# Patient Record
Sex: FEMALE | Race: ASIAN | NOT HISPANIC OR LATINO | Employment: UNEMPLOYED | URBAN - METROPOLITAN AREA
[De-identification: names, ages, dates, MRNs, and addresses within clinical notes are randomized per-mention and may not be internally consistent; named-entity substitution may affect disease eponyms.]

---

## 2018-01-25 ENCOUNTER — TRANSFERRED RECORDS (OUTPATIENT)
Dept: HEALTH INFORMATION MANAGEMENT | Facility: CLINIC | Age: 23
End: 2018-01-25

## 2018-07-04 ENCOUNTER — HOSPITAL ENCOUNTER (OUTPATIENT)
Facility: CLINIC | Age: 23
Setting detail: OBSERVATION
Discharge: HOME | End: 2018-07-05
Attending: EMERGENCY MEDICINE | Admitting: SURGERY
Payer: COMMERCIAL

## 2018-07-04 ENCOUNTER — APPOINTMENT (OUTPATIENT)
Dept: ULTRASOUND IMAGING | Facility: CLINIC | Age: 23
End: 2018-07-04
Attending: EMERGENCY MEDICINE
Payer: COMMERCIAL

## 2018-07-04 DIAGNOSIS — K37 APPENDICITIS, UNSPECIFIED APPENDICITIS TYPE: Primary | ICD-10-CM

## 2018-07-04 DIAGNOSIS — R10.9 ABDOMINAL PAIN: ICD-10-CM

## 2018-07-04 LAB
ALBUMIN UR-MCNC: NEGATIVE MG/DL
ANION GAP SERPL CALCULATED.3IONS-SCNC: 7 MMOL/L (ref 3–14)
APPEARANCE UR: CLEAR
BACTERIA #/AREA URNS HPF: ABNORMAL /HPF
BASOPHILS # BLD AUTO: 0 10E9/L (ref 0–0.2)
BASOPHILS NFR BLD AUTO: 0.1 %
BILIRUB UR QL STRIP: NEGATIVE
BUN SERPL-MCNC: 8 MG/DL (ref 7–30)
CALCIUM SERPL-MCNC: 8.7 MG/DL (ref 8.5–10.1)
CHLORIDE SERPL-SCNC: 104 MMOL/L (ref 94–109)
CO2 SERPL-SCNC: 27 MMOL/L (ref 20–32)
COLOR UR AUTO: ABNORMAL
CREAT SERPL-MCNC: 0.77 MG/DL (ref 0.52–1.04)
DIFFERENTIAL METHOD BLD: NORMAL
EOSINOPHIL # BLD AUTO: 0 10E9/L (ref 0–0.7)
EOSINOPHIL NFR BLD AUTO: 0.3 %
ERYTHROCYTE [DISTWIDTH] IN BLOOD BY AUTOMATED COUNT: 12.2 % (ref 10–15)
GFR SERPL CREATININE-BSD FRML MDRD: >90 ML/MIN/1.7M2
GLUCOSE SERPL-MCNC: 117 MG/DL (ref 70–99)
GLUCOSE UR STRIP-MCNC: NEGATIVE MG/DL
HCG SERPL QL: NEGATIVE
HCT VFR BLD AUTO: 38.5 % (ref 35–47)
HGB BLD-MCNC: 13.1 G/DL (ref 11.7–15.7)
HGB UR QL STRIP: NEGATIVE
IMM GRANULOCYTES # BLD: 0 10E9/L (ref 0–0.4)
IMM GRANULOCYTES NFR BLD: 0.3 %
KETONES UR STRIP-MCNC: NEGATIVE MG/DL
LEUKOCYTE ESTERASE UR QL STRIP: ABNORMAL
LYMPHOCYTES # BLD AUTO: 2.4 10E9/L (ref 0.8–5.3)
LYMPHOCYTES NFR BLD AUTO: 23.3 %
MCH RBC QN AUTO: 29.8 PG (ref 26.5–33)
MCHC RBC AUTO-ENTMCNC: 34 G/DL (ref 31.5–36.5)
MCV RBC AUTO: 88 FL (ref 78–100)
MONOCYTES # BLD AUTO: 0.8 10E9/L (ref 0–1.3)
MONOCYTES NFR BLD AUTO: 7.6 %
NEUTROPHILS # BLD AUTO: 7 10E9/L (ref 1.6–8.3)
NEUTROPHILS NFR BLD AUTO: 68.4 %
NITRATE UR QL: NEGATIVE
NRBC # BLD AUTO: 0 10*3/UL
NRBC BLD AUTO-RTO: 0 /100
PH UR STRIP: 6.5 PH (ref 5–7)
PLATELET # BLD AUTO: 259 10E9/L (ref 150–450)
POTASSIUM SERPL-SCNC: 3 MMOL/L (ref 3.4–5.3)
RBC # BLD AUTO: 4.39 10E12/L (ref 3.8–5.2)
RBC #/AREA URNS AUTO: 1 /HPF (ref 0–2)
SODIUM SERPL-SCNC: 138 MMOL/L (ref 133–144)
SOURCE: ABNORMAL
SP GR UR STRIP: 1 (ref 1–1.03)
SQUAMOUS #/AREA URNS AUTO: 1 /HPF (ref 0–1)
UROBILINOGEN UR STRIP-MCNC: NORMAL MG/DL (ref 0–2)
WBC # BLD AUTO: 10.3 10E9/L (ref 4–11)
WBC #/AREA URNS AUTO: 3 /HPF (ref 0–5)

## 2018-07-04 PROCEDURE — 25000128 H RX IP 250 OP 636: Performed by: EMERGENCY MEDICINE

## 2018-07-04 PROCEDURE — 96365 THER/PROPH/DIAG IV INF INIT: CPT

## 2018-07-04 PROCEDURE — 76705 ECHO EXAM OF ABDOMEN: CPT

## 2018-07-04 PROCEDURE — 81001 URINALYSIS AUTO W/SCOPE: CPT | Performed by: EMERGENCY MEDICINE

## 2018-07-04 PROCEDURE — G0378 HOSPITAL OBSERVATION PER HR: HCPCS

## 2018-07-04 PROCEDURE — 85025 COMPLETE CBC W/AUTO DIFF WBC: CPT | Performed by: EMERGENCY MEDICINE

## 2018-07-04 PROCEDURE — 99285 EMERGENCY DEPT VISIT HI MDM: CPT | Mod: 25

## 2018-07-04 PROCEDURE — 80048 BASIC METABOLIC PNL TOTAL CA: CPT | Performed by: EMERGENCY MEDICINE

## 2018-07-04 PROCEDURE — 96361 HYDRATE IV INFUSION ADD-ON: CPT

## 2018-07-04 PROCEDURE — 84703 CHORIONIC GONADOTROPIN ASSAY: CPT | Performed by: EMERGENCY MEDICINE

## 2018-07-04 RX ORDER — NALOXONE HYDROCHLORIDE 0.4 MG/ML
.1-.4 INJECTION, SOLUTION INTRAMUSCULAR; INTRAVENOUS; SUBCUTANEOUS
Status: DISCONTINUED | OUTPATIENT
Start: 2018-07-04 | End: 2018-07-05 | Stop reason: HOSPADM

## 2018-07-04 RX ORDER — ACETAMINOPHEN 650 MG/1
650 SUPPOSITORY RECTAL EVERY 4 HOURS PRN
Status: DISCONTINUED | OUTPATIENT
Start: 2018-07-04 | End: 2018-07-05 | Stop reason: HOSPADM

## 2018-07-04 RX ORDER — ACETAMINOPHEN 325 MG/1
650 TABLET ORAL EVERY 4 HOURS PRN
Status: DISCONTINUED | OUTPATIENT
Start: 2018-07-04 | End: 2018-07-05 | Stop reason: HOSPADM

## 2018-07-04 RX ORDER — HYDROMORPHONE HYDROCHLORIDE 1 MG/ML
.3-.5 INJECTION, SOLUTION INTRAMUSCULAR; INTRAVENOUS; SUBCUTANEOUS
Status: DISCONTINUED | OUTPATIENT
Start: 2018-07-04 | End: 2018-07-05 | Stop reason: HOSPADM

## 2018-07-04 RX ORDER — HYDROMORPHONE HYDROCHLORIDE 1 MG/ML
0.5 INJECTION, SOLUTION INTRAMUSCULAR; INTRAVENOUS; SUBCUTANEOUS
Status: DISCONTINUED | OUTPATIENT
Start: 2018-07-04 | End: 2018-07-05 | Stop reason: HOSPADM

## 2018-07-04 RX ORDER — ONDANSETRON 4 MG/1
4 TABLET, ORALLY DISINTEGRATING ORAL EVERY 6 HOURS PRN
Status: DISCONTINUED | OUTPATIENT
Start: 2018-07-04 | End: 2018-07-05 | Stop reason: HOSPADM

## 2018-07-04 RX ORDER — SODIUM CHLORIDE, SODIUM LACTATE, POTASSIUM CHLORIDE, CALCIUM CHLORIDE 600; 310; 30; 20 MG/100ML; MG/100ML; MG/100ML; MG/100ML
INJECTION, SOLUTION INTRAVENOUS CONTINUOUS
Status: DISCONTINUED | OUTPATIENT
Start: 2018-07-04 | End: 2018-07-05 | Stop reason: HOSPADM

## 2018-07-04 RX ORDER — PIPERACILLIN SODIUM, TAZOBACTAM SODIUM 3; .375 G/15ML; G/15ML
3.38 INJECTION, POWDER, LYOPHILIZED, FOR SOLUTION INTRAVENOUS EVERY 8 HOURS SCHEDULED
Status: DISCONTINUED | OUTPATIENT
Start: 2018-07-05 | End: 2018-07-05 | Stop reason: HOSPADM

## 2018-07-04 RX ORDER — SODIUM CHLORIDE 9 MG/ML
1000 INJECTION, SOLUTION INTRAVENOUS CONTINUOUS
Status: DISCONTINUED | OUTPATIENT
Start: 2018-07-04 | End: 2018-07-05 | Stop reason: HOSPADM

## 2018-07-04 RX ORDER — ONDANSETRON 2 MG/ML
4 INJECTION INTRAMUSCULAR; INTRAVENOUS EVERY 30 MIN PRN
Status: DISCONTINUED | OUTPATIENT
Start: 2018-07-04 | End: 2018-07-05 | Stop reason: HOSPADM

## 2018-07-04 RX ORDER — NORGESTIMATE AND ETHINYL ESTRADIOL 0.25-0.035
1 KIT ORAL DAILY
COMMUNITY

## 2018-07-04 RX ORDER — ONDANSETRON 2 MG/ML
4 INJECTION INTRAMUSCULAR; INTRAVENOUS EVERY 6 HOURS PRN
Status: DISCONTINUED | OUTPATIENT
Start: 2018-07-04 | End: 2018-07-05 | Stop reason: HOSPADM

## 2018-07-04 RX ORDER — PIPERACILLIN SODIUM, TAZOBACTAM SODIUM 3; .375 G/15ML; G/15ML
3.38 INJECTION, POWDER, LYOPHILIZED, FOR SOLUTION INTRAVENOUS ONCE
Status: COMPLETED | OUTPATIENT
Start: 2018-07-04 | End: 2018-07-05

## 2018-07-04 RX ADMIN — SODIUM CHLORIDE 1000 ML: 9 INJECTION, SOLUTION INTRAVENOUS at 21:51

## 2018-07-04 RX ADMIN — PIPERACILLIN SODIUM,TAZOBACTAM SODIUM 3.38 G: 3; .375 INJECTION, POWDER, FOR SOLUTION INTRAVENOUS at 23:21

## 2018-07-04 ASSESSMENT — ENCOUNTER SYMPTOMS
FEVER: 0
CONSTIPATION: 0
HEMATURIA: 0
DYSURIA: 0
VOMITING: 0
ABDOMINAL PAIN: 1
DIFFICULTY URINATING: 0
DIARRHEA: 0
NAUSEA: 1

## 2018-07-04 NOTE — IP AVS SNAPSHOT
MRN:[id number]                      After Visit Summary   7/4/2018    Gus Regalado    MRN: [id number]           Thank you!     Thank you for choosing [facility] for your care. Our goal is always to provide you with excellent care. Hearing back from our patients is one way we can continue to improve our services. Please take a few minutes to complete the written survey that you may receive in the mail after you visit with us. Thank you!        Patient Information     Date Of Birth          1995        About your hospital stay     You were admitted on:  July 4, 2018 You last received care in the:  [facility] PACU    You were discharged on:  July 5, 2018       Who to Call     For medical emergencies, please call 911.  For non-urgent questions about your medical care, please call your primary care provider or clinic, [phone]  For questions related to your surgery, please call your surgery clinic        Attending Provider     Provider Specialty    Devora Huerta MD Emergency Medicine    Rex, Zafar Barton MD Surgery    Zeng, Vinh Salinas MD General Surgery       Primary Care Provider Office Phone # Fax #    Yakup Ozbek [phone]      After Care Instructions     Discharge Instructions       No follow up is needed. Our office will contact you within 2 weeks to check on your progress and answer any questions you may have.  If you have concerns before then or would like to see a provider, please call our office at [phone]                  Further instructions from your care team       [facility]  Discharge Instructions: Post-Operative Laparoscopic Appendectomy    ACTIVITY    Expect to feel tired after your surgery.  This will gradually resolve.      Take frequent, short walks and increase your activity gradually.      Avoid strenuous physical activity or heavy lifting greater than 15-20 lbs. for 2 weeks.  You may climb  stairs.    You may drive without restrictions when you are not using any prescription pain medication and feel comfortable in a car.    You may return to work/school when you are comfortable without any prescription pain medication.    WOUND CARE    You may remove your outer dressing or Band-Aids and shower 24 hours after the surgery.  Pat your incisions dry and leave them open to air.  Re-apply dressing (Band-Aids or gauze/tape) as needed for comfort or drainage.    You may have steri-strips (looks like white tape) on your incision.  You may peel off the steri-strips 1 week after your surgery if they have not peeled off on their own.     Do not soak your incisions in a tub or pool for 2 weeks.     Do not apply any lotions, creams, or ointments to your incisions.    A ridge under your incisions is normal and will gradually resolve.    DIET    Gradually resume your regular diet as tolerated.  Avoid heavy, spicy, and greasy meals for 2-3 days.    Drink plenty of fluids to stay hydrated.    PAIN    Expect some tenderness and discomfort at the incision sites.  Use the prescribed pain medication at your discretion.  Expect gradual resolution of your pain over several days.    You may take ibuprofen with food (unless you have been told not to) instead of or in addition to your prescribed pain medication.  If you are taking Norco or Percocet, do not take any additional acetaminophen/APAP/Tylenol.    Do not drink alcohol or drive while you are taking pain medications.    You may apply ice to your incisions in 20 minute intervals as needed for the next 48 hours.  After that time, consider switching to heat if you prefer.    EXPECTATIONS    Pain medications can cause constipation.  Limit use when possible.  Take over the counter stool softener/stimulant, such as Colace or Senna, 1-2 times a day with plenty of water.  You may take a mild over the counter laxative, such as Miralax or a suppository, as needed.  You make  discontinue these medications once you are having regular bowel movements and/or are no longer taking your narcotic pain medication.     You may have shoulder or upper back discomfort due to the gas used in surgery.  This is temporary and should resolve in 48-72 hours.  Short, frequent walks may help with this.    FOLLOW UP    Our office will contact you approximately 2-3 weeks to check on your progress and answer any questions you may have.  If you are doing well, you will not need to return for a follow up appointment.  If any concerns are identified over the phone, we will help you make an appointment to see a provider.     If you have not received a phone call, have any questions or concerns, or would like to be seen, please call us at [phone] and ask to speak with our nurse.  We are located at [address] [place] [address].    CALL OUR OFFICE [phone] IF YOU HAVE:     Chills or fever above 101 F.    Increased redness, warmth, or drainage at your incisions.    Significant bleeding.    Pain not relieved by your pain medication or rest.    Increasing pain after the first 48 hours.    Any other concerns or questions.    Revised January 2018    Same Day Surgery Discharge Instructions for  Sedation and General Anesthesia       It's not unusual to feel dizzy, light-headed or faint for up to 24 hours after surgery or while taking pain medication.  If you have these symptoms: sit for a few minutes before standing and have someone assist you when you get up to walk or use the bathroom.      You should rest and relax for the next 24 hours. We recommend you make arrangements to have an adult stay with you for at least 24 hours after your discharge.  Avoid hazardous and strenuous activity.      DO NOT DRIVE any vehicle or operate mechanical equipment for 24 hours following the end of your surgery.  Even though you may feel normal, your reactions may be affected by the medication you have  received.      Do not drink alcoholic beverages for 24 hours following surgery.       Slowly progress to your regular diet as you feel able. It's not unusual to feel nauseated and/or vomit after receiving anesthesia.  If you develop these symptoms, drink clear liquids (apple juice, ginger ale, broth, 7-up, etc. ) until you feel better.  If your nausea and vomiting persists for 24 hours, please notify your surgeon.        All narcotic pain medications, along with inactivity and anesthesia, can cause constipation. Drinking plenty of liquids and increasing fiber intake will help.      For any questions of a medical nature, call your surgeon.      Do not make important decisions for 24 hours.      If you had general anesthesia, you may have a sore throat for a couple of days related to the breathing tube used during surgery.  You may use Cepacol lozenges to help with this discomfort.  If it worsens or if you develop a fever, contact your surgeon.       If you feel your pain is not well managed with the pain medications prescribed by your surgeon, please contact your surgeon's office to let them know so they can address your concerns.       Information for Patients Discharging with a Transderm Scopolamine Patch       Dry mouth is a common side effect.    Drowsiness is another common side effect especially when combined with pain medication.  Please avoid activities that require mental alertness such as driving a car or making important legal decisions.    Since Scopolamine can cause temporary dilation of the pupils and blurred vision if it comes in contact with the eyes; be sure to wash your hands thoroughly with soap and water immediately after handling the patch.   When you remove your patch, please stick it to a tissue or paper towel for disposal.      Remove the patch immediately and contact a physician in the unlikely event that you experience symptoms of acute glaucoma (pain and reddening of the eyes, accompanied  "by dilated pupils).    Remove the patch if you develop any difficulties urinating.  If you cannot urinate after removing your patch, please notify your surgeon.    Remove the patch 24 hours after surgery.      Today you received Toradol, an antiinflammatory medication similar to Ibuprofen.  You should not take other antiinflammatory medication, such as Ibuprofen, Motrin, Advil, Aleve, Naprosyn, etc, until 2:38pm.     **If you have questions or concerns about your procedure,   call Dr. Goodman at [phone]**      Additional Information     If you use hormonal birth control (such as the pill, patch, ring or implants): You'll need a second form of birth control for 7 days (condoms, a diaphragm or contraceptive foam). While in the hospital, you received a medicine called Bridion. Your normal birth control will not work as well for a week after taking this medicine.          Pending Results     Date and Time Order Name Status Description    7/5/2018 0804 Surgical pathology exam In process             Admission Information     Date & Time Provider Department Dept. Phone    7/4/2018 Vinh Zeng MD [facility] PACU [phone]      Your Vitals Were     Blood Pressure Pulse Temperature Respirations Height Weight    114/88 76 99.6  F (37.6  C) (Temporal) 20 1.575 m (5' 2\") 51.6 kg (113 lb 12.8 oz)    Pulse Oximetry BMI (Body Mass Index)                100% 20.81 kg/m2          MyChart Information     [employer] lets you send messages to your doctor, view your test results, renew your prescriptions, schedule appointments and more. To sign up, go to [email or web address] . Click on \"Log in\" on the left side of the screen, which will take you to the Welcome page. Then click on \"Sign up Now\" on the right side of the page.     You will be asked to enter the access code listed below, as well as some personal information. Please follow the directions to create your username and password.     Your access code " is: [email or web address]  Expires: 10/3/2018 10:28 AM     Your access code will  in 90 days. If you need help or a new code, please call your [facility] clinic or [phone].        Care EveryWhere ID     This is your Care EveryWhere ID. This could be used by other organizations to access your [facility] medical records  [id number]        Equal Access to Services     SHANI TAYLOR : Hadii aad ku hadasho Soomaali, waaxda luqadaha, qaybta kaalmada adeegyada, waxay areliin haydinan riabhumi jalloh yonas . So [facility] [phone].    ATENCIÓN: Si marko lyons, tiene a chavez disposición servicios gratuitos de asistencia lingüística. Llame al [phone].    We comply with applicable federal civil rights laws and [place] laws. We do not discriminate on the basis of race, color, national origin, age, disability, sex, sexual orientation, or gender identity.               Review of your medicines      START taking        Dose / Directions    HYDROcodone-acetaminophen 5-325 MG per tablet   Commonly known as:  NORCO   Used for:  Appendicitis, unspecified appendicitis type        Dose:  1-2 tablet   Take 1-2 tablets by mouth every 4 hours as needed for other (Moderate to Severe Pain)   Quantity:  20 tablet   Refills:  0       ibuprofen 600 MG tablet   Commonly known as:  ADVIL/MOTRIN   Used for:  Appendicitis, unspecified appendicitis type        Dose:  600 mg   Take 1 tablet (600 mg) by mouth every 6 hours as needed for pain (mild)   Quantity:  30 tablet   Refills:  0       senna 8.6 MG tablet   Commonly known as:  SENOKOT   Used for:  Appendicitis, unspecified appendicitis type        Dose:  1 tablet   Take 1 tablet by mouth 2 times daily while on narcotics   Quantity:  10 tablet   Refills:  1         CONTINUE these medicines which have NOT CHANGED        Dose / Directions    HYDROXYZINE HCL PO        Dose:  10 mg   Take 10 mg by mouth nightly as needed for itching   Refills:  0       norgestimate-ethinyl estradiol 0.25-35 MG-MCG per  tablet   Commonly known as:  ORTHO-CYCLEN, SPRINTEC        Dose:  1 tablet   Take 1 tablet by mouth daily   Refills:  0       WELLBUTRIN XL PO        Dose:  300 mg   Take 300 mg by mouth daily   Refills:  0            Where to get your medicines      These medications were sent to [facility] [place], [place] - [address]  [address], [place] [address]     Phone:  [phone]     ibuprofen 600 MG tablet    senna 8.6 MG tablet         Some of these will need a paper prescription and others can be bought over the counter. Ask your nurse if you have questions.     Bring a paper prescription for each of these medications     HYDROcodone-acetaminophen 5-325 MG per tablet                Protect others around you: Learn how to safely use, store and throw away your medicines at www.disposemymeds.org.        Information about OPIOIDS     PRESCRIPTION OPIOIDS: WHAT YOU NEED TO KNOW   We gave you an opioid (narcotic) pain medicine. It is important to manage your pain, but opioids are not always the best choice. You should first try all the other options your care team gave you. Take this medicine for as short a time (and as few doses) as possible.     These medicines have risks:    DO NOT drive when on new or higher doses of pain medicine. These medicines can affect your alertness and reaction times, and you could be arrested for driving under the influence (DUI). If you need to use opioids long-term, talk to your care team about driving.    DO NOT operate heave machinery    DO NOT do any other dangerous activities while taking these medicines.     DO NOT drink any alcohol while taking these medicines.      If the opioid prescribed includes acetaminophen, DO NOT take with any other medicines that contain acetaminophen. Read all labels carefully. Look for the word  acetaminophen  or  Tylenol.  Ask your pharmacist if you have questions or are unsure.    You can get addicted to pain medicines,  especially if you have a history of addiction (chemical, alcohol or substance dependence). Talk to your care team about ways to reduce this risk.    Store your pills in a secure place, locked if possible. We will not replace any lost or stolen medicine. If you don t finish your medicine, please throw away (dispose) as directed by your pharmacist. The [place] Pollution Control Agency has more information about safe disposal: https://www.pca.[facility].[place].us/living-green/managing-unwanted-medications.     All opioids tend to cause constipation. Drink plenty of water and eat foods that have a lot of fiber, such as fruits, vegetables, prune juice, apple juice and high-fiber cereal. Take a laxative (Miralax, milk of magnesia, Colace, Senna) if you don t move your bowels at least every other day.              Medication List: This is a list of all your medications and when to take them. Check marks below indicate your daily home schedule. Keep this list as a reference.      Medications           Morning Afternoon Evening Bedtime As Needed    HYDROcodone-acetaminophen 5-325 MG per tablet   Commonly known as:  NORCO   Take 1-2 tablets by mouth every 4 hours as needed for other (Moderate to Severe Pain)                                HYDROXYZINE HCL PO   Take 10 mg by mouth nightly as needed for itching                                ibuprofen 600 MG tablet   Commonly known as:  ADVIL/MOTRIN   Take 1 tablet (600 mg) by mouth every 6 hours as needed for pain (mild)                                norgestimate-ethinyl estradiol 0.25-35 MG-MCG per tablet   Commonly known as:  ORTHO-CYCLEN, SPRINTEC   Take 1 tablet by mouth daily                                senna 8.6 MG tablet   Commonly known as:  SENOKOT   Take 1 tablet by mouth 2 times daily while on narcotics                                WELLBUTRIN XL PO   Take 300 mg by mouth daily

## 2018-07-04 NOTE — IP AVS SNAPSHOT
[facility] [address]    [place] [address]    Phone:  [phone]                                       After Visit Summary   7/4/2018    Gus Regalado    MRN: [id number]           After Visit Summary Signature Page     I have received my discharge instructions, and my questions have been answered. I have discussed any challenges I see with this plan with the nurse or doctor.    ..........................................................................................................................................  Patient/Patient Representative Signature      ..........................................................................................................................................  Patient Representative Print Name and Relationship to Patient    ..................................................               ................................................  Date                                            Time    ..........................................................................................................................................  Reviewed by Signature/Title    ...................................................              ..............................................  Date                                                            Time

## 2018-07-05 ENCOUNTER — ANESTHESIA (OUTPATIENT)
Dept: SURGERY | Facility: CLINIC | Age: 23
End: 2018-07-05
Payer: COMMERCIAL

## 2018-07-05 ENCOUNTER — ANESTHESIA EVENT (OUTPATIENT)
Dept: SURGERY | Facility: CLINIC | Age: 23
End: 2018-07-05
Payer: COMMERCIAL

## 2018-07-05 VITALS
DIASTOLIC BLOOD PRESSURE: 81 MMHG | BODY MASS INDEX: 20.94 KG/M2 | HEIGHT: 62 IN | TEMPERATURE: 99.4 F | OXYGEN SATURATION: 98 % | HEART RATE: 76 BPM | RESPIRATION RATE: 16 BRPM | WEIGHT: 113.8 LBS | SYSTOLIC BLOOD PRESSURE: 128 MMHG

## 2018-07-05 LAB — POTASSIUM SERPL-SCNC: 3.9 MMOL/L (ref 3.4–5.3)

## 2018-07-05 PROCEDURE — 25000566 ZZH SEVOFLURANE, EA 15 MIN: Performed by: SURGERY

## 2018-07-05 PROCEDURE — 25000128 H RX IP 250 OP 636: Performed by: NURSE ANESTHETIST, CERTIFIED REGISTERED

## 2018-07-05 PROCEDURE — 27210794 ZZH OR GENERAL SUPPLY STERILE: Performed by: SURGERY

## 2018-07-05 PROCEDURE — 25000128 H RX IP 250 OP 636: Performed by: SURGERY

## 2018-07-05 PROCEDURE — 36000058 ZZH SURGERY LEVEL 3 EA 15 ADDTL MIN: Performed by: SURGERY

## 2018-07-05 PROCEDURE — 96376 TX/PRO/DX INJ SAME DRUG ADON: CPT

## 2018-07-05 PROCEDURE — 37000008 ZZH ANESTHESIA TECHNICAL FEE, 1ST 30 MIN: Performed by: SURGERY

## 2018-07-05 PROCEDURE — 36000056 ZZH SURGERY LEVEL 3 1ST 30 MIN: Performed by: SURGERY

## 2018-07-05 PROCEDURE — 88304 TISSUE EXAM BY PATHOLOGIST: CPT | Performed by: SURGERY

## 2018-07-05 PROCEDURE — 44970 LAPAROSCOPY APPENDECTOMY: CPT | Mod: AS | Performed by: PHYSICIAN ASSISTANT

## 2018-07-05 PROCEDURE — G0378 HOSPITAL OBSERVATION PER HR: HCPCS

## 2018-07-05 PROCEDURE — 88304 TISSUE EXAM BY PATHOLOGIST: CPT | Mod: 26 | Performed by: SURGERY

## 2018-07-05 PROCEDURE — 25000125 ZZHC RX 250: Performed by: ANESTHESIOLOGY

## 2018-07-05 PROCEDURE — 25000128 H RX IP 250 OP 636: Performed by: EMERGENCY MEDICINE

## 2018-07-05 PROCEDURE — 37000009 ZZH ANESTHESIA TECHNICAL FEE, EACH ADDTL 15 MIN: Performed by: SURGERY

## 2018-07-05 PROCEDURE — 36415 COLL VENOUS BLD VENIPUNCTURE: CPT | Performed by: ANESTHESIOLOGY

## 2018-07-05 PROCEDURE — 25000125 ZZHC RX 250: Performed by: NURSE ANESTHETIST, CERTIFIED REGISTERED

## 2018-07-05 PROCEDURE — 40000169 ZZH STATISTIC PRE-PROCEDURE ASSESSMENT I: Performed by: SURGERY

## 2018-07-05 PROCEDURE — 71000013 ZZH RECOVERY PHASE 1 LEVEL 1 EA ADDTL HR: Performed by: SURGERY

## 2018-07-05 PROCEDURE — 44970 LAPAROSCOPY APPENDECTOMY: CPT | Performed by: SURGERY

## 2018-07-05 PROCEDURE — 71000012 ZZH RECOVERY PHASE 1 LEVEL 1 FIRST HR: Performed by: SURGERY

## 2018-07-05 PROCEDURE — 84132 ASSAY OF SERUM POTASSIUM: CPT | Performed by: ANESTHESIOLOGY

## 2018-07-05 PROCEDURE — 25000128 H RX IP 250 OP 636: Performed by: ANESTHESIOLOGY

## 2018-07-05 PROCEDURE — 96366 THER/PROPH/DIAG IV INF ADDON: CPT

## 2018-07-05 PROCEDURE — 25000125 ZZHC RX 250: Performed by: SURGERY

## 2018-07-05 PROCEDURE — 96367 TX/PROPH/DG ADDL SEQ IV INF: CPT

## 2018-07-05 PROCEDURE — C9399 UNCLASSIFIED DRUGS OR BIOLOG: HCPCS | Performed by: NURSE ANESTHETIST, CERTIFIED REGISTERED

## 2018-07-05 PROCEDURE — 99203 OFFICE O/P NEW LOW 30 MIN: CPT | Mod: 57 | Performed by: SURGERY

## 2018-07-05 RX ORDER — LIDOCAINE HYDROCHLORIDE 20 MG/ML
INJECTION, SOLUTION INFILTRATION; PERINEURAL PRN
Status: DISCONTINUED | OUTPATIENT
Start: 2018-07-05 | End: 2018-07-05

## 2018-07-05 RX ORDER — LORAZEPAM 2 MG/ML
0.5 INJECTION INTRAMUSCULAR ONCE
Status: COMPLETED | OUTPATIENT
Start: 2018-07-05 | End: 2018-07-05

## 2018-07-05 RX ORDER — ALBUTEROL SULFATE 0.83 MG/ML
2.5 SOLUTION RESPIRATORY (INHALATION) EVERY 4 HOURS PRN
Status: DISCONTINUED | OUTPATIENT
Start: 2018-07-05 | End: 2018-07-05 | Stop reason: HOSPADM

## 2018-07-05 RX ORDER — LIDOCAINE 40 MG/G
CREAM TOPICAL
Status: DISCONTINUED | OUTPATIENT
Start: 2018-07-05 | End: 2018-07-05 | Stop reason: HOSPADM

## 2018-07-05 RX ORDER — PROPOFOL 10 MG/ML
INJECTION, EMULSION INTRAVENOUS PRN
Status: DISCONTINUED | OUTPATIENT
Start: 2018-07-05 | End: 2018-07-05

## 2018-07-05 RX ORDER — FENTANYL CITRATE 50 UG/ML
25-50 INJECTION, SOLUTION INTRAMUSCULAR; INTRAVENOUS
Status: DISCONTINUED | OUTPATIENT
Start: 2018-07-05 | End: 2018-07-05 | Stop reason: HOSPADM

## 2018-07-05 RX ORDER — POTASSIUM CHLORIDE 1500 MG/1
20-40 TABLET, EXTENDED RELEASE ORAL
Status: DISCONTINUED | OUTPATIENT
Start: 2018-07-05 | End: 2018-07-05 | Stop reason: HOSPADM

## 2018-07-05 RX ORDER — HYDROCODONE BITARTRATE AND ACETAMINOPHEN 5; 325 MG/1; MG/1
1-2 TABLET ORAL EVERY 4 HOURS PRN
Qty: 20 TABLET | Refills: 0 | Status: SHIPPED | OUTPATIENT
Start: 2018-07-05

## 2018-07-05 RX ORDER — KETOROLAC TROMETHAMINE 30 MG/ML
INJECTION, SOLUTION INTRAMUSCULAR; INTRAVENOUS PRN
Status: DISCONTINUED | OUTPATIENT
Start: 2018-07-05 | End: 2018-07-05

## 2018-07-05 RX ORDER — POTASSIUM CHLORIDE 7.45 MG/ML
10 INJECTION INTRAVENOUS
Status: DISCONTINUED | OUTPATIENT
Start: 2018-07-05 | End: 2018-07-05 | Stop reason: HOSPADM

## 2018-07-05 RX ORDER — POTASSIUM CHLORIDE 29.8 MG/ML
20 INJECTION INTRAVENOUS
Status: DISCONTINUED | OUTPATIENT
Start: 2018-07-05 | End: 2018-07-05 | Stop reason: HOSPADM

## 2018-07-05 RX ORDER — FENTANYL CITRATE 50 UG/ML
INJECTION, SOLUTION INTRAMUSCULAR; INTRAVENOUS PRN
Status: DISCONTINUED | OUTPATIENT
Start: 2018-07-05 | End: 2018-07-05

## 2018-07-05 RX ORDER — PROPOFOL 10 MG/ML
INJECTION, EMULSION INTRAVENOUS CONTINUOUS PRN
Status: DISCONTINUED | OUTPATIENT
Start: 2018-07-05 | End: 2018-07-05

## 2018-07-05 RX ORDER — ONDANSETRON 4 MG/1
4 TABLET, ORALLY DISINTEGRATING ORAL EVERY 30 MIN PRN
Status: DISCONTINUED | OUTPATIENT
Start: 2018-07-05 | End: 2018-07-05 | Stop reason: HOSPADM

## 2018-07-05 RX ORDER — ONDANSETRON 2 MG/ML
INJECTION INTRAMUSCULAR; INTRAVENOUS PRN
Status: DISCONTINUED | OUTPATIENT
Start: 2018-07-05 | End: 2018-07-05

## 2018-07-05 RX ORDER — SCOLOPAMINE TRANSDERMAL SYSTEM 1 MG/1
1 PATCH, EXTENDED RELEASE TRANSDERMAL ONCE
Status: COMPLETED | OUTPATIENT
Start: 2018-07-05 | End: 2018-07-05

## 2018-07-05 RX ORDER — NALOXONE HYDROCHLORIDE 0.4 MG/ML
.1-.4 INJECTION, SOLUTION INTRAMUSCULAR; INTRAVENOUS; SUBCUTANEOUS
Status: DISCONTINUED | OUTPATIENT
Start: 2018-07-05 | End: 2018-07-05 | Stop reason: HOSPADM

## 2018-07-05 RX ORDER — SODIUM CHLORIDE, SODIUM LACTATE, POTASSIUM CHLORIDE, CALCIUM CHLORIDE 600; 310; 30; 20 MG/100ML; MG/100ML; MG/100ML; MG/100ML
INJECTION, SOLUTION INTRAVENOUS CONTINUOUS
Status: DISCONTINUED | OUTPATIENT
Start: 2018-07-05 | End: 2018-07-05 | Stop reason: HOSPADM

## 2018-07-05 RX ORDER — ONDANSETRON 2 MG/ML
4 INJECTION INTRAMUSCULAR; INTRAVENOUS EVERY 30 MIN PRN
Status: DISCONTINUED | OUTPATIENT
Start: 2018-07-05 | End: 2018-07-05 | Stop reason: HOSPADM

## 2018-07-05 RX ORDER — MEPERIDINE HYDROCHLORIDE 25 MG/ML
12.5 INJECTION INTRAMUSCULAR; INTRAVENOUS; SUBCUTANEOUS EVERY 5 MIN PRN
Status: DISCONTINUED | OUTPATIENT
Start: 2018-07-05 | End: 2018-07-05 | Stop reason: HOSPADM

## 2018-07-05 RX ORDER — POTASSIUM CL/LIDO/0.9 % NACL 10MEQ/0.1L
10 INTRAVENOUS SOLUTION, PIGGYBACK (ML) INTRAVENOUS
Status: DISCONTINUED | OUTPATIENT
Start: 2018-07-05 | End: 2018-07-05 | Stop reason: HOSPADM

## 2018-07-05 RX ORDER — HYDROMORPHONE HYDROCHLORIDE 1 MG/ML
.3-.5 INJECTION, SOLUTION INTRAMUSCULAR; INTRAVENOUS; SUBCUTANEOUS EVERY 5 MIN PRN
Status: DISCONTINUED | OUTPATIENT
Start: 2018-07-05 | End: 2018-07-05 | Stop reason: HOSPADM

## 2018-07-05 RX ORDER — DEXAMETHASONE SODIUM PHOSPHATE 4 MG/ML
INJECTION, SOLUTION INTRA-ARTICULAR; INTRALESIONAL; INTRAMUSCULAR; INTRAVENOUS; SOFT TISSUE PRN
Status: DISCONTINUED | OUTPATIENT
Start: 2018-07-05 | End: 2018-07-05

## 2018-07-05 RX ORDER — KETOROLAC TROMETHAMINE 15 MG/ML
15 INJECTION, SOLUTION INTRAMUSCULAR; INTRAVENOUS
Status: DISCONTINUED | OUTPATIENT
Start: 2018-07-05 | End: 2018-07-05 | Stop reason: HOSPADM

## 2018-07-05 RX ORDER — LORAZEPAM 2 MG/ML
.5-1 INJECTION INTRAMUSCULAR
Status: COMPLETED | OUTPATIENT
Start: 2018-07-05 | End: 2018-07-05

## 2018-07-05 RX ORDER — HYDROXYZINE HYDROCHLORIDE 25 MG/1
25 TABLET, FILM COATED ORAL
Status: DISCONTINUED | OUTPATIENT
Start: 2018-07-05 | End: 2018-07-05 | Stop reason: HOSPADM

## 2018-07-05 RX ORDER — SENNOSIDES A AND B 8.6 MG/1
1 TABLET, FILM COATED ORAL 2 TIMES DAILY
Qty: 10 TABLET | Refills: 1 | Status: SHIPPED | OUTPATIENT
Start: 2018-07-05

## 2018-07-05 RX ORDER — HYDROCODONE BITARTRATE AND ACETAMINOPHEN 5; 325 MG/1; MG/1
1 TABLET ORAL
Status: DISCONTINUED | OUTPATIENT
Start: 2018-07-05 | End: 2018-07-05 | Stop reason: HOSPADM

## 2018-07-05 RX ORDER — POTASSIUM CHLORIDE 1.5 G/1.58G
20-40 POWDER, FOR SOLUTION ORAL
Status: DISCONTINUED | OUTPATIENT
Start: 2018-07-05 | End: 2018-07-05 | Stop reason: HOSPADM

## 2018-07-05 RX ORDER — IBUPROFEN 600 MG/1
600 TABLET, FILM COATED ORAL EVERY 6 HOURS PRN
Qty: 30 TABLET | Refills: 0 | Status: SHIPPED | OUTPATIENT
Start: 2018-07-05

## 2018-07-05 RX ORDER — ALBUTEROL SULFATE 0.83 MG/ML
2.5 SOLUTION RESPIRATORY (INHALATION)
Status: DISCONTINUED | OUTPATIENT
Start: 2018-07-05 | End: 2018-07-05 | Stop reason: HOSPADM

## 2018-07-05 RX ADMIN — PROPOFOL 100 MCG/KG/MIN: 10 INJECTION, EMULSION INTRAVENOUS at 07:43

## 2018-07-05 RX ADMIN — FENTANYL CITRATE 50 MCG: 50 INJECTION INTRAMUSCULAR; INTRAVENOUS at 09:11

## 2018-07-05 RX ADMIN — Medication 10 MEQ: at 04:45

## 2018-07-05 RX ADMIN — Medication 10 MEQ: at 02:00

## 2018-07-05 RX ADMIN — PROPOFOL 150 MG: 10 INJECTION, EMULSION INTRAVENOUS at 07:43

## 2018-07-05 RX ADMIN — Medication 0.5 MG: at 09:25

## 2018-07-05 RX ADMIN — SODIUM CHLORIDE, POTASSIUM CHLORIDE, SODIUM LACTATE AND CALCIUM CHLORIDE: 600; 310; 30; 20 INJECTION, SOLUTION INTRAVENOUS at 00:18

## 2018-07-05 RX ADMIN — FENTANYL CITRATE 50 MCG: 50 INJECTION INTRAMUSCULAR; INTRAVENOUS at 09:04

## 2018-07-05 RX ADMIN — Medication 10 MEQ: at 00:39

## 2018-07-05 RX ADMIN — DEXMEDETOMIDINE HYDROCHLORIDE 4 MCG: 100 INJECTION, SOLUTION INTRAVENOUS at 07:59

## 2018-07-05 RX ADMIN — KETOROLAC TROMETHAMINE 30 MG: 30 INJECTION, SOLUTION INTRAMUSCULAR at 08:15

## 2018-07-05 RX ADMIN — Medication 10 MEQ: at 02:59

## 2018-07-05 RX ADMIN — SCOPALAMINE 1 PATCH: 1 PATCH, EXTENDED RELEASE TRANSDERMAL at 07:20

## 2018-07-05 RX ADMIN — ROCURONIUM BROMIDE 30 MG: 10 INJECTION INTRAVENOUS at 07:43

## 2018-07-05 RX ADMIN — SODIUM CHLORIDE, POTASSIUM CHLORIDE, SODIUM LACTATE AND CALCIUM CHLORIDE: 600; 310; 30; 20 INJECTION, SOLUTION INTRAVENOUS at 08:18

## 2018-07-05 RX ADMIN — PIPERACILLIN SODIUM,TAZOBACTAM SODIUM 3.38 G: 3; .375 INJECTION, POWDER, FOR SOLUTION INTRAVENOUS at 06:50

## 2018-07-05 RX ADMIN — SUGAMMADEX 200 MG: 100 INJECTION, SOLUTION INTRAVENOUS at 08:19

## 2018-07-05 RX ADMIN — LORAZEPAM 0.5 MG: 2 INJECTION INTRAMUSCULAR; INTRAVENOUS at 09:54

## 2018-07-05 RX ADMIN — LIDOCAINE HYDROCHLORIDE 100 MG: 20 INJECTION, SOLUTION INFILTRATION; PERINEURAL at 07:43

## 2018-07-05 RX ADMIN — ONDANSETRON 4 MG: 2 INJECTION INTRAMUSCULAR; INTRAVENOUS at 08:15

## 2018-07-05 RX ADMIN — LORAZEPAM 0.5 MG: 2 INJECTION INTRAMUSCULAR; INTRAVENOUS at 09:48

## 2018-07-05 RX ADMIN — FENTANYL CITRATE 100 MCG: 50 INJECTION, SOLUTION INTRAMUSCULAR; INTRAVENOUS at 07:43

## 2018-07-05 RX ADMIN — DEXAMETHASONE SODIUM PHOSPHATE 4 MG: 4 INJECTION, SOLUTION INTRA-ARTICULAR; INTRALESIONAL; INTRAMUSCULAR; INTRAVENOUS; SOFT TISSUE at 07:48

## 2018-07-05 RX ADMIN — MIDAZOLAM 2 MG: 1 INJECTION INTRAMUSCULAR; INTRAVENOUS at 07:38

## 2018-07-05 NOTE — ED PROVIDER NOTES
"  History     Chief Complaint:  Abdominal Pain      HPI   Gus Regalado is a 22 year old female who presents with abdominal pain. The patient said today she had mid abdominal pain which started at 0100. The pain has localized to her right lower quadrant and has become more intense since it began. She has felt nauseated and has a poor appetite, however she did eat a little bit an hour ago. She also had retching which did not produce vomit. The patient denies any bowel changes, urinary changes, or fever. She notes her last period was two weeks ago.     Allergies:  No known allergies     Medications:    Celexa  Atarax    Past Medical History:    Depression/Anxiety    Past Surgical History:    The patient does not have any pertinent past surgical history.    Family History:    No past pertinent family history.    Social History:  Patient presents with her mother, sister, and fiance.   She lives with her mother.   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   Negative for drugs, alcohol, and drugs.   Her primary care is at [facility] in [place].    Review of Systems   Constitutional: Negative for fever.   Gastrointestinal: Positive for abdominal pain and nausea. Negative for constipation, diarrhea and vomiting.   Genitourinary: Negative for difficulty urinating, dysuria and hematuria.   All other systems reviewed and are negative.    Physical Exam   First Vitals:  BP: 118/79  Pulse: 81  Temp: 98.4  F (36.9  C)  Resp: 18  Height: 157.5 cm (5' 2\")  Weight: 50.3 kg (111 lb)  SpO2: 96 %      Physical Exam  Constitutional:  Oriented to person, place, and time.      Appears well-developed and well-nourished.   HENT:   Head:    Normocephalic and atraumatic.   Right Ear:   Tympanic membrane and external ear normal.   Left Ear:   Tympanic membrane and external ear normal.   Mouth/Throat:   Oropharynx is clear and moist.      Mucous membranes are normal.   Eyes:    Conjunctivae " normal and EOM are normal.      Pupils are equal, round, and reactive to light.   Neck:    Normal range of motion. Neck supple.   Cardiovascular:  Normal rate, regular rhythm, S1 normal and S2 normal.      No gallop and no friction rub. No murmur heard.  Pulmonary/Chest:  Breath sounds normal. No respiratory distress.      No wheezes. No rhonchi. No rales.   Abdominal:   Soft. No hepatosplenomegaly.      No rebound and no CVA tenderness. Tenderness with guarding in the right    lower quadrant  Musculoskeletal:  Normal range of motion.   Neurological:   Alert and oriented to person, place, and time. Normal strength.      GCS eye subscore is 4. GCS verbal subscore is 5.      GCS motor subscore is 6.   Skin:    Skin is warm and dry.   Psychiatric:   Normal mood and affect.      Speech is normal and behavior is normal.      Judgment and thought content normal.      Cognition and memory are normal.         Emergency Department Course     Imaging:  Radiographic findings were communicated with the patient who voiced understanding of the findings.  US Appendix:  Findings are concerning for acute appendicitis given the  findings and the patient's symptoms.  Results per radiology      Laboratory:  CBC: WBC: 10.3, HGB: 13.1, PLT: 259  BMP: Glucose 117 (H), Potassium: 3.0 (L), o/w WNL (Creatinine: 0.77)  HCG Blood: negative  UA with micro: Leukocyte: Small, Bacteria: Few o/w negative    Interventions:  2200 - sodium chloride bolus 1000 mL IV  0001 - Zosyn 3.375 g IV    Emergency Department Course:  2158 - Nursing notes and vitals reviewed.Patient sent to ultrasound.    2225: I performed an exam of the patient as documented above. IV inserted and blood drawn.  Labs ordered    2253: I rechecked the patient. Explained findings and plan to patient and family.     2258: Discussed the patient with Dr. Zeng, who will admit the patient to a surgery bed for further monitoring, evaluation, and treatment.     Impression & Plan       Medical Decision Making:  Patient is a healthy 22-year-old who comes in with typical history for appendicitis and appendicitis shown on ultrasound.  I talked to Dr. Zeng he will be taking her to the operating room in the morning.  She has been given a dose of Zosyn here and will be kept n.p.o.      Diagnosis:    ICD-10-CM    1. Abdominal pain R10.9        I, Bijan Watts, am serving as a scribe on 7/4/2018 at 10:25 PM to personally document services performed by Devora Huerta MD based on my observations and the provider's statements to me.       Bijan Watts  7/4/2018    EMERGENCY DEPARTMENT       Devora Huerta MD  07/05/18 0038

## 2018-07-05 NOTE — ANESTHESIA PREPROCEDURE EVALUATION
Anesthesia Evaluation     . Pt has not had prior anesthetic            ROS/MED HX    ENT/Pulmonary:  - neg pulmonary ROS    (-) sleep apnea   Neurologic:  - neg neurologic ROS     Cardiovascular:  - neg cardiovascular ROS       METS/Exercise Tolerance:     Hematologic:  - neg hematologic  ROS       Musculoskeletal:  - neg musculoskeletal ROS       GI/Hepatic:  - neg GI/hepatic ROS      (-) GERD   Renal/Genitourinary:  - ROS Renal section negative       Endo:  - neg endo ROS       Psychiatric:  - neg psychiatric ROS       Infectious Disease:  - neg infectious disease ROS       Malignancy:         Other:                     Physical Exam  Normal systems: dental    Airway   Mallampati: II  TM distance: >3 FB  Neck ROM: full    Dental     Cardiovascular   Rhythm and rate: regular      Pulmonary    breath sounds clear to auscultation                    Anesthesia Plan      History & Physical Review  History and physical reviewed and following examination; no interval change.    ASA Status:  1 .    NPO Status:  > 8 hours    Plan for General and ETT with Intravenous induction. Maintenance will be Balanced.    PONV prophylaxis:  Ondansetron (or other 5HT-3), Dexamethasone or Solumedrol and Scopolamine patch  Received IV potassium overnight      Postoperative Care  Postoperative pain management:  Multi-modal analgesia.      Consents  Anesthetic plan, risks, benefits and alternatives discussed with:  Patient..        Procedure: Procedure(s):  LAPAROSCOPIC APPENDECTOMY  Preop diagnosis: Appendicitis    No Known Allergies  History reviewed. No pertinent past medical history.  History reviewed. No pertinent surgical history.  Social History   Substance Use Topics     Smoking status: Never Smoker     Smokeless tobacco: Never Used     Alcohol use No     Prior to Admission medications    Medication Sig Start Date End Date Taking? Authorizing Provider   BuPROPion HCl (WELLBUTRIN XL PO) Take 300 mg by mouth daily   Yes Unknown,  Entered By History   HYDROXYZINE HCL PO Take 10 mg by mouth nightly as needed for itching   Yes Unknown, Entered By History   norgestimate-ethinyl estradiol (ORTHO-CYCLEN, SPRINTEC) 0.25-35 MG-MCG per tablet Take 1 tablet by mouth daily   Yes Unknown, Entered By History     Current Facility-Administered Medications Ordered in Epic   Medication Dose Route Frequency Last Rate Last Dose     acetaminophen (TYLENOL) Suppository 650 mg  650 mg Rectal Q4H PRN         acetaminophen (TYLENOL) tablet 650 mg  650 mg Oral Q4H PRN         HYDROmorphone (PF) (DILAUDID) injection 0.3-0.5 mg  0.3-0.5 mg Intravenous Q1H PRN         HYDROmorphone (PF) (DILAUDID) injection 0.5 mg  0.5 mg Intravenous Q15 Min PRN         lactated ringers infusion   Intravenous Continuous 100 mL/hr at 07/05/18 0018       melatonin tablet 1 mg  1 mg Oral At Bedtime PRN         naloxone (NARCAN) injection 0.1-0.4 mg  0.1-0.4 mg Intravenous Q2 Min PRN         ondansetron (ZOFRAN) injection 4 mg  4 mg Intravenous Q30 Min PRN         ondansetron (ZOFRAN-ODT) ODT tab 4 mg  4 mg Oral Q6H PRN        Or     ondansetron (ZOFRAN) injection 4 mg  4 mg Intravenous Q6H PRN         piperacillin-tazobactam (ZOSYN) 3.375 g vial to attach to  mL bag  3.375 g Intravenous Q8H ISABEL   3.375 g at 07/05/18 0650     potassium chloride (KLOR-CON) Packet 20-40 mEq  20-40 mEq Oral or Feeding Tube Q2H PRN         potassium chloride 10 mEq in 100 mL intermittent infusion with 10 mg lidocaine  10 mEq Intravenous Q1H PRN   10 mEq at 07/05/18 0445     potassium chloride 10 mEq in 100 mL sterile water intermittent infusion (premix)  10 mEq Intravenous Q1H PRN         potassium chloride 20 mEq in 50 mL intermittent infusion  20 mEq Intravenous Q2H PRN         potassium chloride SA (K-DUR/KLOR-CON M) CR tablet 20-40 mEq  20-40 mEq Oral Q2H PRN         sodium chloride 0.9% infusion  1,000 mL Intravenous Continuous         No current [facility]-ordered outpatient prescriptions on file.        lactated ringers 100 mL/hr at 07/05/18 0018     sodium chloride       Wt Readings from Last 1 Encounters:   07/05/18 51.6 kg (113 lb 12.8 oz)     Temp Readings from Last 1 Encounters:   07/05/18 37.2  C (99  F) (Oral)     BP Readings from Last 6 Encounters:   07/05/18 117/72   06/24/16 105/69     Pulse Readings from Last 4 Encounters:   07/05/18 83   06/24/16 60     Resp Readings from Last 1 Encounters:   07/05/18 15     SpO2 Readings from Last 1 Encounters:   07/05/18 99%     Recent Labs   Lab Test  07/04/18   2148   NA  138   POTASSIUM  3.0*   CHLORIDE  104   CO2  27   ANIONGAP  7   GLC  117*   BUN  8   CR  0.77   SEJAL  8.7     Recent Labs   Lab Test  07/04/18   2148   WBC  10.3   HGB  13.1   PLT  259     Recent Labs   Lab Test  06/24/16   1825   HCG  Negative

## 2018-07-05 NOTE — DISCHARGE INSTRUCTIONS
[facility]  Discharge Instructions: Post-Operative Laparoscopic Appendectomy    ACTIVITY    Expect to feel tired after your surgery.  This will gradually resolve.      Take frequent, short walks and increase your activity gradually.      Avoid strenuous physical activity or heavy lifting greater than 15-20 lbs. for 2 weeks.  You may climb stairs.    You may drive without restrictions when you are not using any prescription pain medication and feel comfortable in a car.    You may return to work/school when you are comfortable without any prescription pain medication.    WOUND CARE    You may remove your outer dressing or Band-Aids and shower 24 hours after the surgery.  Pat your incisions dry and leave them open to air.  Re-apply dressing (Band-Aids or gauze/tape) as needed for comfort or drainage.    You may have steri-strips (looks like white tape) on your incision.  You may peel off the steri-strips 1 week after your surgery if they have not peeled off on their own.     Do not soak your incisions in a tub or pool for 2 weeks.     Do not apply any lotions, creams, or ointments to your incisions.    A ridge under your incisions is normal and will gradually resolve.    DIET    Gradually resume your regular diet as tolerated.  Avoid heavy, spicy, and greasy meals for 2-3 days.    Drink plenty of fluids to stay hydrated.    PAIN    Expect some tenderness and discomfort at the incision sites.  Use the prescribed pain medication at your discretion.  Expect gradual resolution of your pain over several days.    You may take ibuprofen with food (unless you have been told not to) instead of or in addition to your prescribed pain medication.  If you are taking Norco or Percocet, do not take any additional acetaminophen/APAP/Tylenol.    Do not drink alcohol or drive while you are taking pain medications.    You may apply ice to your incisions in 20 minute intervals as needed for the  next 48 hours.  After that time, consider switching to heat if you prefer.    EXPECTATIONS    Pain medications can cause constipation.  Limit use when possible.  Take over the counter stool softener/stimulant, such as Colace or Senna, 1-2 times a day with plenty of water.  You may take a mild over the counter laxative, such as Miralax or a suppository, as needed.  You make discontinue these medications once you are having regular bowel movements and/or are no longer taking your narcotic pain medication.     You may have shoulder or upper back discomfort due to the gas used in surgery.  This is temporary and should resolve in 48-72 hours.  Short, frequent walks may help with this.    FOLLOW UP    Our office will contact you approximately 2-3 weeks to check on your progress and answer any questions you may have.  If you are doing well, you will not need to return for a follow up appointment.  If any concerns are identified over the phone, we will help you make an appointment to see a provider.     If you have not received a phone call, have any questions or concerns, or would like to be seen, please call us at [phone] and ask to speak with our nurse.  We are located at [address] [place] [address].    CALL OUR OFFICE [phone] IF YOU HAVE:     Chills or fever above 101 F.    Increased redness, warmth, or drainage at your incisions.    Significant bleeding.    Pain not relieved by your pain medication or rest.    Increasing pain after the first 48 hours.    Any other concerns or questions.    Revised January 2018    Same Day Surgery Discharge Instructions for  Sedation and General Anesthesia       It's not unusual to feel dizzy, light-headed or faint for up to 24 hours after surgery or while taking pain medication.  If you have these symptoms: sit for a few minutes before standing and have someone assist you when you get up to walk or use the bathroom.      You should rest and relax  for the next 24 hours. We recommend you make arrangements to have an adult stay with you for at least 24 hours after your discharge.  Avoid hazardous and strenuous activity.      DO NOT DRIVE any vehicle or operate mechanical equipment for 24 hours following the end of your surgery.  Even though you may feel normal, your reactions may be affected by the medication you have received.      Do not drink alcoholic beverages for 24 hours following surgery.       Slowly progress to your regular diet as you feel able. It's not unusual to feel nauseated and/or vomit after receiving anesthesia.  If you develop these symptoms, drink clear liquids (apple juice, ginger ale, broth, 7-up, etc. ) until you feel better.  If your nausea and vomiting persists for 24 hours, please notify your surgeon.        All narcotic pain medications, along with inactivity and anesthesia, can cause constipation. Drinking plenty of liquids and increasing fiber intake will help.      For any questions of a medical nature, call your surgeon.      Do not make important decisions for 24 hours.      If you had general anesthesia, you may have a sore throat for a couple of days related to the breathing tube used during surgery.  You may use Cepacol lozenges to help with this discomfort.  If it worsens or if you develop a fever, contact your surgeon.       If you feel your pain is not well managed with the pain medications prescribed by your surgeon, please contact your surgeon's office to let them know so they can address your concerns.       Information for Patients Discharging with a Transderm Scopolamine Patch       Dry mouth is a common side effect.    Drowsiness is another common side effect especially when combined with pain medication.  Please avoid activities that require mental alertness such as driving a car or making important legal decisions.    Since Scopolamine can cause temporary dilation of the pupils and blurred vision if it comes in  contact with the eyes; be sure to wash your hands thoroughly with soap and water immediately after handling the patch.   When you remove your patch, please stick it to a tissue or paper towel for disposal.      Remove the patch immediately and contact a physician in the unlikely event that you experience symptoms of acute glaucoma (pain and reddening of the eyes, accompanied by dilated pupils).    Remove the patch if you develop any difficulties urinating.  If you cannot urinate after removing your patch, please notify your surgeon.    Remove the patch 24 hours after surgery.      Today you received Toradol, an antiinflammatory medication similar to Ibuprofen.  You should not take other antiinflammatory medication, such as Ibuprofen, Motrin, Advil, Aleve, Naprosyn, etc, until 2:38pm.     **If you have questions or concerns about your procedure,   call Dr. Goodman at [phone]**

## 2018-07-05 NOTE — ANESTHESIA POSTPROCEDURE EVALUATION
Patient: Gus Regalado    Procedure(s):  Laparoscopic Appendectomy - Wound Class: III-Contaminated    Diagnosis:Appendicitis  Diagnosis Additional Information: No value filed.    Anesthesia Type:  General, ETT    Note:  Anesthesia Post Evaluation    Patient location during evaluation: Bedside  Patient participation: Able to fully participate in evaluation  Level of consciousness: awake and alert  Pain management: adequate  Airway patency: patent  Cardiovascular status: acceptable  Respiratory status: acceptable  Hydration status: acceptable  PONV: none             Last vitals:  Vitals:    07/05/18 1015 07/05/18 1030 07/05/18 1145   BP: 115/86 112/73 128/81   Pulse:      Resp: 23 16 16   Temp:  37.4  C (99.4  F)    SpO2: 100% 96% 98%         Electronically Signed By: Alcon Smith MD  July 5, 2018  2:49 PM

## 2018-07-05 NOTE — ED NOTES
"[facility]  ED Nurse Handoff Report    ED Chief complaint: Abdominal Pain (RLQ abdominal pain since this am, started mid abdomen. +nausea LBM 7/4/18 normal. No history of abdominal surgeries)      ED Diagnosis:   Final diagnoses:   None       Code Status: Full Code    Allergies: No Known Allergies    Activity level - Baseline/Home:  Independent    Activity Level - Current:   Independent     Needed?: No    Isolation: No  Infection: Not Applicable  Bariatric?: No    Vital Signs:   Vitals:    07/04/18 2129 07/04/18 2224 07/04/18 2226   BP: 118/79 123/83    Pulse: 81     Resp: 18     Temp: 98.4  F (36.9  C)     TempSrc: Oral     SpO2: 96% 100% 100%   Weight: 50.3 kg (111 lb)     Height: 1.575 m (5' 2\")         Cardiac Rhythm: ,        Pain level: 0-10 Pain Scale: 5    Is this patient confused?: No   [place] - Suicide Severity Rating Scale Completed?  Yes  If yes, what color did the patient score?  White    Patient Report: Initial Complaint: RLQ pain and nausea  Focused Assessment: AOx3, AVSS. RLQ pain and nausea, declines pain/nausea and this time. NPO in ED. Voids w/o difficulty.   Tests Performed: US, labs, U/A  Abnormal Results: US shows acute appenditis  Treatments provided: 1000cc bolus, IV abx    Family Comments: at bedside    OBS brochure/video discussed/provided to patient: Yes    ED Medications:   Medications   0.9% sodium chloride BOLUS (1,000 mLs Intravenous New Bag 7/4/18 1765)     Followed by   sodium chloride 0.9% infusion (not administered)   ondansetron (ZOFRAN) injection 4 mg (not administered)   HYDROmorphone (PF) (DILAUDID) injection 0.5 mg (not administered)   piperacillin-tazobactam (ZOSYN) 3.375 g vial to attach to  mL bag (not administered)   acetaminophen (TYLENOL) tablet 650 mg (not administered)   acetaminophen (TYLENOL) Suppository 650 mg (not administered)   naloxone (NARCAN) injection 0.1-0.4 mg (not administered)   melatonin tablet 1 mg (not administered) "   ondansetron (ZOFRAN-ODT) ODT tab 4 mg (not administered)     Or   ondansetron (ZOFRAN) injection 4 mg (not administered)   lactated ringers infusion (not administered)   HYDROmorphone (PF) (DILAUDID) injection 0.3-0.5 mg (not administered)   piperacillin-tazobactam (ZOSYN) 3.375 g vial to attach to  mL bag (not administered)       Drips infusing?:  No    For the majority of the shift this patient was Green.   Interventions performed were frequent rounding.    Severe Sepsis OR Septic Shock Diagnosis Present: No      ED NURSE PHONE NUMBER: [phone]

## 2018-07-05 NOTE — PHARMACY-ADMISSION MEDICATION HISTORY
Admission medication history interview status for the 7/4/2018  admission is complete. See EPIC admission navigator for prior to admission medications     Medication history source reliability:Good    Actions taken by pharmacist (provider contacted, etc):None     Additional medication history information not noted on PTA med list :None    Medication reconciliation/reorder completed by provider prior to medication history? No    Time spent in this activity: 7 min      Prior to Admission medications    Medication Sig Last Dose Taking? Auth Provider   BuPROPion HCl (WELLBUTRIN XL PO) Take 300 mg by mouth daily 7/3/2018 at Unknown time Yes Unknown, Entered By History   HYDROXYZINE HCL PO Take 10 mg by mouth nightly as needed for itching  Yes Unknown, Entered By History   norgestimate-ethinyl estradiol (ORTHO-CYCLEN, SPRINTEC) 0.25-35 MG-MCG per tablet Take 1 tablet by mouth daily  Yes Unknown, Entered By History

## 2018-07-05 NOTE — PROGRESS NOTES
RECEIVING UNIT ED HANDOFF REVIEW    ED Nurse Handoff Report was reviewed by: Cristina Carmona on July 4, 2018 at 11:36 PM

## 2018-07-05 NOTE — OR NURSING
Patient was admitting to some increase in pain when this writer assumed care at 0900. Began crying, becoming very upset, more so after each dose of medication. Given 50 mcg Fentanyl x2, 0.5 mg Dilaudid. Contacted Dr. Smith, order obtained for Ativan 0.5 mg, which has been given. Dr. Smith came to bedside to assess patient, ok to give additional 0.5 mg to 1 mg, up to 2 mg of Ativan if needed. Patient has now stated that she has anxiety at home, but family is not aware, and she does not want them to find out. Patient does take Wellbutrin at home, Hydroxyzine PRN for anxiety.

## 2018-07-05 NOTE — PLAN OF CARE
Problem: Patient Care Overview  Goal: Plan of Care/Patient Progress Review  A&Ox4. IND. VSS. IVF infusing. K+ 3.0, replaced via IV. Recheck this AM at 0645. Voiding. C/o RLQ pain. PRN Dilaudid and Tylenol ordered. Will have surgery today. Has been NPO since admission. Will continue to monitor.

## 2018-07-05 NOTE — H&P
[facility]  General Surgery Consultation         Zafar Barton Rex    Gus Regalado MRN# [id number]   YOB: 1995 Age: 22 year old      Date of Admission:  7/4/2018  Date of Consult: 7/5/2018         Assessment and Plan:   Patient is a 22 year old female with acute appendicitis    PLAN:  I discussed the pathophysiology of appendicitis including medical and surgical management. I have also personally reviewed the imaging studies that show probable acute appendicitis. I would recommend going forward with laparoscopic appendectomy today. I have also discussed the risks, benefits, complications including but not limited to bleeding, infection, possible injury to the bowel or ureter, possible anastomotic dehiscence, possible post operative abscess, possible postoperative MI, PE, or other anesthetic complications. If one of these complications did arise the patient could require further surgery. The patient thoroughly understood the conversation and will sign consent.         Requesting Physician:      Dr. Huerta        Chief Complaint:     Chief Complaint   Patient presents with     Abdominal Pain     RLQ abdominal pain since this am, started mid abdomen. +nausea LBM 7/4/18 normal. No history of abdominal surgeries          History of Present Illness:   Patient is a 22 year old female who I was asked to see by Dr. Huerta for evaluation of appendicitis.The patient describes having symptoms for the last 1 days. The pain is mainly located in the RLQ area. The patient rates the pain a 4 out of 10. The pain is exacerbated by activity. The pain is relieved by rest. US showed probable appendicitis.  The patient does not have nausea and vomiting. Patient denies fevers, chills, nausea, vomiting, jaundice, changes in stool or urine, headache, SOB, chest pain.          Physical Exam:   Blood pressure 111/70, pulse 76, temperature 97.4  F (36.3  C), temperature source Temporal, resp. rate  "12, height 5' 2\" (1.575 m), weight 113 lb 12.8 oz (51.6 kg), SpO2 99 %.  113 lbs 12.8 oz  General: Generally appears well.  Psych: Alert and Oriented.  Normal affect  Neurological: grossly intact  Eyes: Sclera clear  Respiratory:  Lungs with good air excursion  Cardiovascular:  Regular Rate and Rhythm with no murmurs gallops or rubs, normal peripheral pulses  GI: Abdomen Soft Moderate tenderness to palpation RLQ No hernias palpated..  Integumentary:  No rashes       Past Medical History:   History reviewed. No pertinent past medical history.       Past Surgical History:   History reviewed. No pertinent surgical history.       Current Medications:           [Auto Hold] piperacillin-tazobactam  3.375 g Intravenous Q8H ISABEL     sodium chloride (PF)  3 mL Intracatheter Q8H     [Auto Hold] acetaminophen, [Auto Hold] acetaminophen, albuterol, [Auto Hold] HYDROmorphone, [Auto Hold] HYDROmorphone, lidocaine 4%, lidocaine (buffered or not buffered), [Auto Hold] melatonin, [Auto Hold] naloxone, [Auto Hold] ondansetron, [Auto Hold] ondansetron **OR** [Auto Hold] ondansetron, [Auto Hold] potassium chloride, [Auto Hold] potassium chloride with lidocaine, [Auto Hold] potassium chloride, [Auto Hold] potassium chloride, [Auto Hold] potassium chloride, sodium chloride (PF)       Home Medications:     Prior to Admission medications    Medication Sig Last Dose Taking? Auth Provider   BuPROPion HCl (WELLBUTRIN XL PO) Take 300 mg by mouth daily 7/3/2018 at Unknown time Yes Unknown, Entered By History   HYDROXYZINE HCL PO Take 10 mg by mouth nightly as needed for itching  Yes Unknown, Entered By History   norgestimate-ethinyl estradiol (ORTHO-CYCLEN, SPRINTEC) 0.25-35 MG-MCG per tablet Take 1 tablet by mouth daily  Yes Unknown, Entered By History          Allergies:   No Known Allergies       Family History:   Family history personally reviewed and revealed no pertinent history.        Social History:   Gus Regalaod  reports that she has " never smoked. She has never used smokeless tobacco. She reports that she does not drink alcohol or use illicit drugs.        Review of Systems:   The 10 point Review of Systems is negative other than noted in the HPI.       Labs/Imaging   All new lab and imaging data was reviewed.     Zafar Goodman M.D.  [facility]

## 2018-07-05 NOTE — OP NOTE
PREOPERATIVE DIAGNOSIS: Acute appendicitis.     POSTOPERATIVE DIAGNOSIS: Mesenteric adenitis with possible appendicitis.     PROCEDURE: Laparoscopic appendectomy.     SURGEON: Zafar Goodman MD     ASSISTANT: Liv Chapa PA-C    ANESTHESIA: GET.     COMPLICATIONS: None.     BLOOD LOSS: Minimal.     FINDINGS: Enlarged mesenteric lymph node in right lower quadrant.  Appendix mildly dilated without signs of overt acute appendicitis..     INDICATIONS: Mrs. Regalado presented with appendicitis and is now presenting for laparoscopic appendectomy. I explained the risks, benefits, complications including but not limited to bleeding, infection, possible need to open, possible postop hematoma, seroma, bowel or bladder injury, all which require additional procedures. The patient did agree and did sign consent.   .   DETAILS OF PROCEDURE: The patient was brought to the operating room per anesthesia, placed in supine position, intubated without difficulty. Surgical timeout was then performed, verifying the correct surgeon, site, procedure and patient. All in the room were in agreement. The patient was prepped and draped in the usual fashion using ChloraPrep. 1% and 0.25% Marcaine were injected to the supraumbilical area. Skin incision was made, carried down to the fascia. The anterior fascia was grasped with 2 Kocher's sharply incised. An open Gopi technique was used to gain entry into the abdominal cavity. A camera was inserted and revealed no trocar injuries. The abdomen was insufflated with pressure of 15, which the patient tolerated well. Two 5's were placed in the suprapubic and left lower quadrant under direct visualization. The appendix was found. It was not inflamed but was dilated.    The base of the cecum and terminal ileum were normal.  There was one mesenteric lymph node that appeared acutely inflamed on the lateral portion of the cecum.  There was no involvement of the surrounding bowel.  There were no other  areas of lymphadenopathy.  Some bleeding occurred which was stopped with a clip.  A mesenteric window was created at the base of the cecum.  A SOFIA blue load was fired across this area without issues. Once this was done, the appendix was freed of the lateral wall with cautery. The appendiceal artery was delineated and a SOFIA white load was fired across this.  Some bleeding occurred which was completely stopped with a clip. The appendix was then placed an EndoCatch bag and removed through the umbilical trocar without difficulty. The area was explored. Staple lines were completely intact. There was no bleeding whatsoever. It was irrigated with saline and suctioned.The pelvis showed no acute findings. All trocars were taken out under direct visualization. The fascia was closed with an 0 Vicryl in figure-of-eight fashion. Marcaine 0.25% injected to all the wounds. They were irrigated and closed with 4-0 Monocryl in subcuticular fashion. Steri strips were applied. The patient tolerated the procedure well and was awoken from anesthesia and transferred to PACU in stable condition. All sponge, instrument, and needle counts were correct at the conclusion of the procedure. A physician assistant was needed for camera operation, retraction, and closure.    HARI MONK MD     Please CC to PCP

## 2018-07-06 LAB — COPATH REPORT: NORMAL

## 2018-07-10 NOTE — DISCHARGE SUMMARY
"Surgery Discharge Summary    Gus Regalado MRN# [id number]   YOB: 1995 Age: 22 year old     Date of Admission:  7/4/2018  Date of Discharge:  7/5/2018 12:49 PM  Admitting Physician:  Zafar Goodman MD  Discharging Service:  [facility] General Surgery   Primary Provider: Faina Hopkins    Discharge Diagnosis:   Principle Diagnosis:  Abdominal pain       Brief HPI: Gus Regalado is a 22 year old female that was seen by Dr Goodman at the request of Dr. Huerta for evaluation of appendicitis.The patient describes having symptoms for the last 1 days. The pain is mainly located in the RLQ area. The patient rates the pain a 4 out of 10. The pain is exacerbated by activity. The pain is relieved by rest. US showed probable appendicitis.  The patient does not have nausea and vomiting. Patient denies fevers, chills, nausea, vomiting, jaundice, changes in stool or urine, headache, SOB, chest pain.  Evaluation in the ED revealed US concerning for acute appendicitis.          Hospital Course: Gus Regalado was admitted to the hospital overnight.  She underwent laparoscopic appendectomy the following morning without complications. Please see op note for further details. The patient had an uncomplicated procedure and was sent to recovery.  After satisfactory recovery, she was discharged home from PACU.  No follow up is needed. Our office will contact patient within 2 weeks to check on her progress and arrange follow up if needed.    Inpatient Consultations: No consultations were requested during this admission    Procedures:   Procedure(s):  Laparoscopic Appendectomy - Wound Class: III-Contaminated    Disposition:   Discharged to home     Discharge Condition  Discharge condition: Stable   Discharge vitals: Blood pressure 128/81, pulse 76, temperature 99.4  F (37.4  C), resp. rate 16, height 1.575 m (5' 2\"), weight 51.6 kg (113 lb 12.8 oz), SpO2 98 %.     Discharge Medications:   Discharge Medication List as of 7/5/2018 10:29 AM    "   START taking these medications    Details   HYDROcodone-acetaminophen (NORCO) 5-325 MG per tablet Take 1-2 tablets by mouth every 4 hours as needed for other (Moderate to Severe Pain), Disp-20 tablet, R-0, Local Print      ibuprofen (ADVIL/MOTRIN) 600 MG tablet Take 1 tablet (600 mg) by mouth every 6 hours as needed for pain (mild), Disp-30 tablet, R-0, E-Prescribe      senna (SENOKOT) 8.6 MG tablet Take 1 tablet by mouth 2 times daily while on narcotics, Disp-10 tablet, R-1, E-Prescribe         CONTINUE these medications which have NOT CHANGED    Details   BuPROPion HCl (WELLBUTRIN XL PO) Take 300 mg by mouth daily, Historical      HYDROXYZINE HCL PO Take 10 mg by mouth nightly as needed for itching, Historical      norgestimate-ethinyl estradiol (ORTHO-CYCLEN, SPRINTEC) 0.25-35 MG-MCG per tablet Take 1 tablet by mouth daily, Historical             Discharge Instructions:     After Care Instructions     Discharge Instructions       No follow up is needed. Our office will contact you within 2 weeks to check on your progress and answer any questions you may have.  If you have concerns before then or would like to see a provider, please call our office at [phone]                            Liv hCapa PA-C

## 2018-07-17 NOTE — ANESTHESIA POSTPROCEDURE EVALUATION
Patient: Gus Regalado    Procedure(s):  Laparoscopic Appendectomy - Wound Class: III-Contaminated    Diagnosis:Appendicitis  Diagnosis Additional Information: No value filed.    Anesthesia Type:  General, ETT    Note:  Anesthesia Post Evaluation    Patient location during evaluation: Bedside  Patient participation: Able to fully participate in evaluation  Level of consciousness: awake and alert  Pain management: adequate  Airway patency: patent  Cardiovascular status: acceptable  Respiratory status: acceptable  Hydration status: acceptable  PONV: none             Last vitals:  Vitals:    07/05/18 1015 07/05/18 1030 07/05/18 1145   BP: 115/86 112/73 128/81   Pulse:      Resp: 23 16 16   Temp:  37.4  C (99.4  F)    SpO2: 100% 96% 98%         Electronically Signed By: Alcon Smith MD  July 16, 2018  11:34 PM

## 2018-07-17 NOTE — ADDENDUM NOTE
Addendum  created 07/16/18 2334 by Alcon Smith MD    Anesthesia Attestations filed, Sign clinical note

## 2018-07-19 ENCOUNTER — TELEPHONE (OUTPATIENT)
Dept: SURGERY | Facility: CLINIC | Age: 23
End: 2018-07-19

## 2018-07-19 NOTE — TELEPHONE ENCOUNTER
[facility]  Post op call note - Laparoscopic Appendectomy    Gus Regalado was called for an update regarding her recovery.  She underwent a laparoscopic appendectomy by Dr. Goodman on July / 05 / 2018.  Today she tells me she is doing well and denies any complaints.  She currently does not need any pain medications.  She is eating a normal diet and her bowels are regular.   The patient states she is slowly resuming normal activity.  She states her wounds are healing well and the steri strips are on.  She denies any erythema or drainage at her wounds.     The pathology revealed acute appendicits.  This was discussed with the patient.     She was instructed to remove steri strips. Patient was instructed to slowly and gradually resume all normal activities. The patient states all of her questions were answered and she understands our discussion.  She agrees to follow up as needed and to call our office with any concerns.    Liv Chapa PA-C  Please route or send letter to:  Primary Care Provider (PCP)

## 2019-11-01 ENCOUNTER — TELEPHONE (OUTPATIENT)
Dept: PSYCHIATRY | Facility: CLINIC | Age: 24
End: 2019-11-01

## 2019-11-01 NOTE — TELEPHONE ENCOUNTER
PSYCHIATRY CLINIC PHONE INTAKE     SERVICES REQUESTED / INTERESTED IN          Individual Psychotherapy     Presenting Problem and Brief History                              What would you like to be seen for? (brief description):  Patient is looking to address depression, and her anxiety in therapy. Patient has attended therapy sessions in the past and they were quite beneficial. Patient is thinking her depression has been spikng as of late, and wants to address it before it gets out of hand. Patient was seeing a psychiatrist and psychologist at [facility], and was on a medication management regime the fall of 2017, although is no longer on any medications. Patient is planning on using her EAP instead of insurance, and was referred to Dr. Courtney Bunch.       Have you received a mental health diagnosis? Yes   Which one (s): Depression, Anxiety   Is there any history of developmental delay?  No   Are you currently seeing a mental health provider?  No            Who / month last seen:  N/A  Do you have mental health records elsewhere?  Yes  Will you sign a release so we can obtain them?  Yes    Have you ever been hospitalized for psychiatric reasons?  No  Describe:  N/A    Do you have current thoughts of self-harm?  No    Do you currently have thoughts of harming others?  No       Substance Use History     Do you have any history of alcohol / illicit drug use?  No  Describe:  N/A  Have you ever received treatment for this?  No    Describe:  N/A     Social History     Does the patient have a guardian?  No    Name / number: N/A  Have you had an ACT team in last 12 months?  No  Describe: N/A   Do you have any current or past legal issues?  No  Describe: N/A   OK to leave a detailed voicemail?  Yes    Medical/ Surgical History                                   Patient Active Problem List   Diagnosis     Appendicitis          Medications             Current Outpatient Medications   Medication Sig Dispense Refill      BuPROPion HCl (WELLBUTRIN XL PO) Take 300 mg by mouth daily       HYDROcodone-acetaminophen (NORCO) 5-325 MG per tablet Take 1-2 tablets by mouth every 4 hours as needed for other (Moderate to Severe Pain) 20 tablet 0     HYDROXYZINE HCL PO Take 10 mg by mouth nightly as needed for itching       ibuprofen (ADVIL/MOTRIN) 600 MG tablet Take 1 tablet (600 mg) by mouth every 6 hours as needed for pain (mild) 30 tablet 0     norgestimate-ethinyl estradiol (ORTHO-CYCLEN, SPRINTEC) 0.25-35 MG-MCG per tablet Take 1 tablet by mouth daily       senna (SENOKOT) 8.6 MG tablet Take 1 tablet by mouth 2 times daily while on narcotics 10 tablet 1     None     DISPOSITION      Forwarding patients information to Dr. Courtney Bunch.    Marvin Alvarez

## 2019-12-03 ENCOUNTER — OFFICE VISIT (OUTPATIENT)
Dept: PSYCHIATRY | Facility: CLINIC | Age: 24
End: 2019-12-03
Payer: COMMERCIAL

## 2019-12-03 DIAGNOSIS — F41.0 PANIC ATTACK: ICD-10-CM

## 2019-12-03 DIAGNOSIS — F33.2 SEVERE RECURRENT MAJOR DEPRESSION WITHOUT PSYCHOTIC FEATURES (H): Primary | ICD-10-CM

## 2019-12-03 DIAGNOSIS — F40.10 SOCIAL ANXIETY DISORDER: ICD-10-CM

## 2019-12-03 DIAGNOSIS — T74.22XA: ICD-10-CM
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
## 2019-12-13 ENCOUNTER — BEH TREATMENT PLAN (OUTPATIENT)
Dept: PSYCHIATRY | Facility: CLINIC | Age: 24
End: 2019-12-13

## 2019-12-13 ENCOUNTER — OFFICE VISIT (OUTPATIENT)
Dept: PSYCHIATRY | Facility: CLINIC | Age: 24
End: 2019-12-13
Attending: PSYCHOLOGIST
Payer: COMMERCIAL

## 2019-12-13 DIAGNOSIS — T74.22XA: ICD-10-CM

## 2019-12-13 DIAGNOSIS — F40.10 SOCIAL ANXIETY DISORDER: ICD-10-CM

## 2019-12-13 DIAGNOSIS — F33.2 SEVERE RECURRENT MAJOR DEPRESSION WITHOUT PSYCHOTIC FEATURES (H): Primary | ICD-10-CM
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
## 2019-12-16 ENCOUNTER — OFFICE VISIT (OUTPATIENT)
Dept: PSYCHIATRY | Facility: CLINIC | Age: 24
End: 2019-12-16
Payer: COMMERCIAL

## 2019-12-16 DIAGNOSIS — T74.22XA: ICD-10-CM

## 2019-12-16 DIAGNOSIS — F33.2 SEVERE RECURRENT MAJOR DEPRESSION WITHOUT PSYCHOTIC FEATURES (H): Primary | ICD-10-CM

## 2019-12-16 DIAGNOSIS — F40.10 SOCIAL ANXIETY DISORDER: ICD-10-CM
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
## 2019-12-17 ASSESSMENT — ANXIETY QUESTIONNAIRES
1. FEELING NERVOUS, ANXIOUS, OR ON EDGE: NEARLY EVERY DAY
3. WORRYING TOO MUCH ABOUT DIFFERENT THINGS: NEARLY EVERY DAY
7. FEELING AFRAID AS IF SOMETHING AWFUL MIGHT HAPPEN: MORE THAN HALF THE DAYS
2. NOT BEING ABLE TO STOP OR CONTROL WORRYING: NEARLY EVERY DAY
5. BEING SO RESTLESS THAT IT IS HARD TO SIT STILL: SEVERAL DAYS
6. BECOMING EASILY ANNOYED OR IRRITABLE: MORE THAN HALF THE DAYS
GAD7 TOTAL SCORE: 15

## 2019-12-17 ASSESSMENT — PATIENT HEALTH QUESTIONNAIRE - PHQ9
5. POOR APPETITE OR OVEREATING: SEVERAL DAYS
SUM OF ALL RESPONSES TO PHQ QUESTIONS 1-9: 23

## 2019-12-18 ASSESSMENT — ANXIETY QUESTIONNAIRES: GAD7 TOTAL SCORE: 15

## 2020-01-07 ENCOUNTER — OFFICE VISIT (OUTPATIENT)
Dept: PSYCHIATRY | Facility: CLINIC | Age: 25
End: 2020-01-07
Payer: COMMERCIAL

## 2020-01-07 DIAGNOSIS — F40.10 SOCIAL ANXIETY DISORDER: ICD-10-CM

## 2020-01-07 DIAGNOSIS — T74.22XA: ICD-10-CM

## 2020-01-07 DIAGNOSIS — F33.2 SEVERE RECURRENT MAJOR DEPRESSION WITHOUT PSYCHOTIC FEATURES (H): Primary | ICD-10-CM

## 2020-01-07 ASSESSMENT — ANXIETY QUESTIONNAIRES
2. NOT BEING ABLE TO STOP OR CONTROL WORRYING: NEARLY EVERY DAY
GAD7 TOTAL SCORE: 18
3. WORRYING TOO MUCH ABOUT DIFFERENT THINGS: NEARLY EVERY DAY
4. TROUBLE RELAXING: NEARLY EVERY DAY
6. BECOMING EASILY ANNOYED OR IRRITABLE: MORE THAN HALF THE DAYS
5. BEING SO RESTLESS THAT IT IS HARD TO SIT STILL: MORE THAN HALF THE DAYS
7. FEELING AFRAID AS IF SOMETHING AWFUL MIGHT HAPPEN: MORE THAN HALF THE DAYS
1. FEELING NERVOUS, ANXIOUS, OR ON EDGE: NEARLY EVERY DAY

## 2020-01-07 ASSESSMENT — PATIENT HEALTH QUESTIONNAIRE - PHQ9: SUM OF ALL RESPONSES TO PHQ QUESTIONS 1-9: 24
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
## 2020-01-08 ASSESSMENT — ANXIETY QUESTIONNAIRES: GAD7 TOTAL SCORE: 18

## 2020-01-13 ENCOUNTER — OFFICE VISIT (OUTPATIENT)
Dept: PSYCHIATRY | Facility: CLINIC | Age: 25
End: 2020-01-13
Payer: COMMERCIAL

## 2020-01-13 DIAGNOSIS — T74.22XA: ICD-10-CM

## 2020-01-13 DIAGNOSIS — F33.2 SEVERE RECURRENT MAJOR DEPRESSION WITHOUT PSYCHOTIC FEATURES (H): Primary | ICD-10-CM

## 2020-01-13 DIAGNOSIS — F40.10 SOCIAL ANXIETY DISORDER: ICD-10-CM

## 2020-01-13 ASSESSMENT — ANXIETY QUESTIONNAIRES
1. FEELING NERVOUS, ANXIOUS, OR ON EDGE: NEARLY EVERY DAY
6. BECOMING EASILY ANNOYED OR IRRITABLE: NEARLY EVERY DAY
5. BEING SO RESTLESS THAT IT IS HARD TO SIT STILL: MORE THAN HALF THE DAYS
4. TROUBLE RELAXING: NEARLY EVERY DAY
7. FEELING AFRAID AS IF SOMETHING AWFUL MIGHT HAPPEN: MORE THAN HALF THE DAYS
GAD7 TOTAL SCORE: 19
3. WORRYING TOO MUCH ABOUT DIFFERENT THINGS: NEARLY EVERY DAY
2. NOT BEING ABLE TO STOP OR CONTROL WORRYING: NEARLY EVERY DAY

## 2020-01-13 ASSESSMENT — PATIENT HEALTH QUESTIONNAIRE - PHQ9: SUM OF ALL RESPONSES TO PHQ QUESTIONS 1-9: 24
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
## 2020-01-14 ASSESSMENT — ANXIETY QUESTIONNAIRES: GAD7 TOTAL SCORE: 19

## 2020-01-28 ENCOUNTER — OFFICE VISIT (OUTPATIENT)
Dept: PSYCHIATRY | Facility: CLINIC | Age: 25
End: 2020-01-28
Payer: COMMERCIAL

## 2020-01-28 DIAGNOSIS — F40.10 SOCIAL ANXIETY DISORDER: ICD-10-CM

## 2020-01-28 DIAGNOSIS — F33.2 SEVERE RECURRENT MAJOR DEPRESSION WITHOUT PSYCHOTIC FEATURES (H): Primary | ICD-10-CM

## 2020-01-28 DIAGNOSIS — T74.22XA: ICD-10-CM

## 2020-01-28 ASSESSMENT — ANXIETY QUESTIONNAIRES
5. BEING SO RESTLESS THAT IT IS HARD TO SIT STILL: SEVERAL DAYS
6. BECOMING EASILY ANNOYED OR IRRITABLE: MORE THAN HALF THE DAYS
2. NOT BEING ABLE TO STOP OR CONTROL WORRYING: MORE THAN HALF THE DAYS
3. WORRYING TOO MUCH ABOUT DIFFERENT THINGS: MORE THAN HALF THE DAYS
7. FEELING AFRAID AS IF SOMETHING AWFUL MIGHT HAPPEN: NOT AT ALL
GAD7 TOTAL SCORE: 11
4. TROUBLE RELAXING: MORE THAN HALF THE DAYS
1. FEELING NERVOUS, ANXIOUS, OR ON EDGE: MORE THAN HALF THE DAYS

## 2020-01-28 ASSESSMENT — PATIENT HEALTH QUESTIONNAIRE - PHQ9: SUM OF ALL RESPONSES TO PHQ QUESTIONS 1-9: 16
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
## 2020-01-29 ASSESSMENT — ANXIETY QUESTIONNAIRES: GAD7 TOTAL SCORE: 11

## 2020-02-10 ENCOUNTER — OFFICE VISIT (OUTPATIENT)
Dept: PSYCHIATRY | Facility: CLINIC | Age: 25
End: 2020-02-10
Payer: COMMERCIAL

## 2020-02-10 DIAGNOSIS — F40.10 SOCIAL ANXIETY DISORDER: ICD-10-CM

## 2020-02-10 DIAGNOSIS — T74.22XA: ICD-10-CM

## 2020-02-10 DIAGNOSIS — F33.1 MODERATE RECURRENT MAJOR DEPRESSION (H): Primary | ICD-10-CM

## 2020-02-10 ASSESSMENT — ANXIETY QUESTIONNAIRES
3. WORRYING TOO MUCH ABOUT DIFFERENT THINGS: MORE THAN HALF THE DAYS
4. TROUBLE RELAXING: MORE THAN HALF THE DAYS
2. NOT BEING ABLE TO STOP OR CONTROL WORRYING: MORE THAN HALF THE DAYS
5. BEING SO RESTLESS THAT IT IS HARD TO SIT STILL: NOT AT ALL
GAD7 TOTAL SCORE: 11
7. FEELING AFRAID AS IF SOMETHING AWFUL MIGHT HAPPEN: SEVERAL DAYS
1. FEELING NERVOUS, ANXIOUS, OR ON EDGE: MORE THAN HALF THE DAYS
6. BECOMING EASILY ANNOYED OR IRRITABLE: MORE THAN HALF THE DAYS

## 2020-02-10 ASSESSMENT — PATIENT HEALTH QUESTIONNAIRE - PHQ9: SUM OF ALL RESPONSES TO PHQ QUESTIONS 1-9: 15
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
## 2020-02-11 ASSESSMENT — ANXIETY QUESTIONNAIRES: GAD7 TOTAL SCORE: 11

## 2020-02-24 ENCOUNTER — OFFICE VISIT (OUTPATIENT)
Dept: PSYCHIATRY | Facility: CLINIC | Age: 25
End: 2020-02-24
Payer: COMMERCIAL

## 2020-02-24 DIAGNOSIS — F40.10 SOCIAL ANXIETY DISORDER: ICD-10-CM

## 2020-02-24 DIAGNOSIS — F33.1 MODERATE RECURRENT MAJOR DEPRESSION (H): Primary | ICD-10-CM

## 2020-02-24 ASSESSMENT — ANXIETY QUESTIONNAIRES
6. BECOMING EASILY ANNOYED OR IRRITABLE: SEVERAL DAYS
2. NOT BEING ABLE TO STOP OR CONTROL WORRYING: MORE THAN HALF THE DAYS
1. FEELING NERVOUS, ANXIOUS, OR ON EDGE: MORE THAN HALF THE DAYS
7. FEELING AFRAID AS IF SOMETHING AWFUL MIGHT HAPPEN: SEVERAL DAYS
3. WORRYING TOO MUCH ABOUT DIFFERENT THINGS: MORE THAN HALF THE DAYS
5. BEING SO RESTLESS THAT IT IS HARD TO SIT STILL: NOT AT ALL
4. TROUBLE RELAXING: SEVERAL DAYS
GAD7 TOTAL SCORE: 9

## 2020-02-24 ASSESSMENT — PATIENT HEALTH QUESTIONNAIRE - PHQ9: SUM OF ALL RESPONSES TO PHQ QUESTIONS 1-9: 11
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
## 2020-02-25 ASSESSMENT — ANXIETY QUESTIONNAIRES: GAD7 TOTAL SCORE: 9

## 2020-03-09 ENCOUNTER — OFFICE VISIT (OUTPATIENT)
Dept: PSYCHIATRY | Facility: CLINIC | Age: 25
End: 2020-03-09
Payer: COMMERCIAL

## 2020-03-09 DIAGNOSIS — F33.1 MODERATE RECURRENT MAJOR DEPRESSION (H): Primary | ICD-10-CM

## 2020-03-09 DIAGNOSIS — T74.22XA: ICD-10-CM

## 2020-03-09 DIAGNOSIS — F40.10 SOCIAL ANXIETY DISORDER: ICD-10-CM

## 2020-03-09 ASSESSMENT — ANXIETY QUESTIONNAIRES
7. FEELING AFRAID AS IF SOMETHING AWFUL MIGHT HAPPEN: SEVERAL DAYS
6. BECOMING EASILY ANNOYED OR IRRITABLE: SEVERAL DAYS
2. NOT BEING ABLE TO STOP OR CONTROL WORRYING: MORE THAN HALF THE DAYS
3. WORRYING TOO MUCH ABOUT DIFFERENT THINGS: MORE THAN HALF THE DAYS
GAD7 TOTAL SCORE: 8
5. BEING SO RESTLESS THAT IT IS HARD TO SIT STILL: NOT AT ALL
4. TROUBLE RELAXING: NOT AT ALL
1. FEELING NERVOUS, ANXIOUS, OR ON EDGE: MORE THAN HALF THE DAYS

## 2020-03-09 ASSESSMENT — PATIENT HEALTH QUESTIONNAIRE - PHQ9: SUM OF ALL RESPONSES TO PHQ QUESTIONS 1-9: 10
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
## 2020-03-10 ASSESSMENT — ANXIETY QUESTIONNAIRES: GAD7 TOTAL SCORE: 8

## 2021-03-02 ENCOUNTER — VIRTUAL VISIT (OUTPATIENT)
Dept: PSYCHIATRY | Facility: CLINIC | Age: 26
End: 2021-03-02
Payer: COMMERCIAL

## 2021-03-02 DIAGNOSIS — F40.10 SOCIAL ANXIETY DISORDER: ICD-10-CM

## 2021-03-02 DIAGNOSIS — F45.21 ILLNESS ANXIETY DISORDER: Primary | ICD-10-CM

## 2021-03-02 DIAGNOSIS — F33.41 MAJOR DEPRESSIVE DISORDER, RECURRENT EPISODE, IN PARTIAL REMISSION (H): ICD-10-CM
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
## 2021-04-05 ENCOUNTER — VIRTUAL VISIT (OUTPATIENT)
Dept: PSYCHIATRY | Facility: CLINIC | Age: 26
End: 2021-04-05
Payer: COMMERCIAL

## 2021-04-05 DIAGNOSIS — F40.10 SOCIAL ANXIETY DISORDER: ICD-10-CM

## 2021-04-05 DIAGNOSIS — F33.41 MAJOR DEPRESSIVE DISORDER, RECURRENT EPISODE, IN PARTIAL REMISSION (H): ICD-10-CM

## 2021-04-05 DIAGNOSIS — T74.22XS: ICD-10-CM

## 2021-04-05 DIAGNOSIS — F45.21 ILLNESS ANXIETY DISORDER: Primary | ICD-10-CM
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
## 2021-04-19 ENCOUNTER — VIRTUAL VISIT (OUTPATIENT)
Dept: PSYCHIATRY | Facility: CLINIC | Age: 26
End: 2021-04-19
Payer: COMMERCIAL

## 2021-04-19 DIAGNOSIS — F45.21 ILLNESS ANXIETY DISORDER: Primary | ICD-10-CM

## 2021-04-19 DIAGNOSIS — F40.10 SOCIAL ANXIETY DISORDER: ICD-10-CM

## 2021-04-19 NOTE — PATIENT INSTRUCTIONS
Theo Robb:    Below, please find the exposure hierarchy and homework discussed:    HW: Shower: resist additional hand-washing while in shower; tolerate discomfort until done with shower.    Hierarchy of COVID- related Exposure Exercises (outside of apartment wears K95 + fabric mask + shield)    1. ?? - shower: no hand-washing while showering  2. 4 - driving in the car by self  3. 5-6 - driving with cousin  4. 5-6 Walking outside (with people nearby)  5. 6 - curbside pickup (7 - if I have to talk to them and roll down window)  6. 6-7 Getting in the elevator by self  7. 7- Picking up a package from   8. 7-8 - seeing/ walking close by a smoker outsides  9. 8 Sharing an elevator with triple masks  10. 8 - shopping at grocery store    Compulsions (no particular order):    Re-assurance seeking from nurse hotline, sister    Getting tested    Shower handwashing rituals    Excessive hand-washing when outside of apartment    Washing / Applying Lysol to new items (food and otherwise)    Treatment Plan:     Complete between session activation assignments:    Continue CBT /ERP/ACT for Illness anxiety and depression    Re-visit medication management.

## 2021-04-19 NOTE — PROGRESS NOTES
"Department of Psychiatry  Diagnostic Evaluation      Date of Service: Apr 19, 2021  Duration of interview with patient: Start Time: 10:05a; Stop Time: 11:00a  Provider: Courtney Bunch, PhD,   Psychiatrist: None  PCP: Faina Hopkins  Other Providers: None  Referred by self     Identifying Data:  Gus Regalado is a 25 year old female who prefers the name of \"Clarisse\". The following information was obtained through a clinical interview, self-report questionnaires, and chart review. For more information, see Diagnostic Evaluation by Courtney Bunhc, PhD on 12/3/2019.    Telehealth Video Visit AddOn  Type of service: Telemedicine Diagnostic Assessment for anxiety.  The patient's condition can be safely assessed and treated via synchronous audio and visual telemedicine encounter.    Mode of Communication:  Video Conference via [employer]  Reason for Telemedicine Visit: This patient visit was converted to a Telehealth video visit to minimize exposure to COVID-19.  Originating Site (Patient Location): Patient's home  Distant Site (Provider Location): Provider Remote Setting  Consent:  The patient has verbally consented to: the potential risks and benefits of telemedicine versus in person care with special emphasis on confidentiality given family members in the home.  Attestation: As the provider I attest to compliance with applicable laws and regulations related to telemedicine.    Encounter Diagnoses   Name Primary?     Illness anxiety disorder Yes     Social anxiety disorder      Illness anxiety disorder: Fear of remigio infections, maria l. COVID-19, care-seeking type, with panic attacks     Assessment (current symptoms):  PHQ 2/10/2020 2/24/2020 3/9/2020   PHQ-9 Total Score 15 11 10   Q9: Thoughts of better off dead/self-harm past 2 weeks Several days Several days Several days     ANEL-7 SCORE 2/10/2020 2/24/2020 3/9/2020   Total Score 11 9 8     Today, patient reports feeling \"fine\". Fear of remigio COVID with obsessions " "and compulsions. Intermittent dysphoria, anhedonia, behavioral avoidance, sleep disturbance, low energy, poor appetite, self-criticism, guilt, concentration impairment, anxiety. Patient denies active suicide ideation, plan, and intent.    This episode: worst depression was Mid March of 2021 (3 weeks ago): very sad, more sad than panicky, strong ache in chest, very painful, physical pain, hard time breathing, de-realization (altered state, \"awareness level is different, looking at self from a different dimension\"), different time concept, despair    Updates since last visit:     Got 1st vaccine ([employer]), expecting second shot of 4/27.    High anxiety when entering elevators    Did walk outside a few times. Outdoors (lives close to [facility]), less people wearing mask.    Has been able to walk for about 7 min. Avoiding areas of high commotion, which makes sense.    Still finds it difficult to find a \"good enough\" reason to leave the apartment.    Continues to engage in cleaning rituals: lysol on containers, washing hands in shower between steps, etc.    Has not visited family in past two weeks.    Treatment strategies:   - explored homework and obstacles (feeling unsafe in neighborhood). Distinguished between reasonable safety measures and compulsions, OCD avoidance behavior.  - developed exposure hierarchy (see below).  - discussed homework    HW: Shower: resist hand-washing while in shower; tolerate discomfort    Social Exposure Exercises:    Jason ways - new people    Walking outside (social unrest)    Hierarchy of COVID- related Exposure Exercises (outside of apartment wears K95 + fabric mask + shield)    1. ?? - shower: no hand-washing while showering  2. 4 - driving in the car by self  3. 5-6 - driving with cousin  4. 5-6 Walking outside (with people nearby)  5. 6 - curbside pickup (7 - if I have to talk to them and roll down window)  6. 6-7 Getting in the elevator by herself  7. 7- Picking up a package from front " desk  8. 7-8 - seeing/ walking close by a smoker outsides  9. 8 Sharing an elevator with triple masks  10. 8 - shopping at grocery store    Compulsions (no particular order):    Re-assurance seeking from nurse hotline, sister    Getting tested    Shower handwashing rituals    Excessive hand-washing when outside of apartment    Washing / Applying Lysol to new items (food and otherwise)    Treatment Plan:     Complete between session activation assignments:    Continue CBT /ERP/ACT for Illness anxiety and depression    Re-visit medication management.     MENTAL STATUS EXAM                                                                                       Alertness: alert  and oriented  Appearance: well groomed  Behavior/Demeanor: polite, quiet, cooperative and pleasant with good  eye contact   Speech: regular rate and rhythm, low volume  Language: intact  Psychomotor: normal or unremarkable  Mood: anxious, worried, fearful  Affect: restricted; was congruent to mood; was congruent to content  Thought Process/Associations: unremarkable  Thought Content:  Reports worries;  Denies suicidal ideation and violent ideation  Perception: Denies auditory hallucinations and visual hallucinations  Insight: good  Judgment: good  Cognition: (6) does  appear grossly intact; formal cognitive testing was not done  Gait and Station: not assessed due to telehealth format    Plan     - Weekly cognitive behavioral therapy for illness anxiety and other anxiety, OC symptoms.  - Discussed and agreed to the following goals for therapy:     Self-like    Tolerating of uncertainly    Able to go into the office    Expand range of activities    Reduce fear of infectous diseases and viruses  - RTC: 2 weeks  - ask for consent to LESVIA with PCP  - Discuss referral for psychiatric medication evaluation    Courtney Bunch, PhD. LP

## 2021-05-09 ENCOUNTER — HEALTH MAINTENANCE LETTER (OUTPATIENT)
Age: 26
End: 2021-05-09

## 2021-05-17 ENCOUNTER — VIRTUAL VISIT (OUTPATIENT)
Dept: PSYCHIATRY | Facility: CLINIC | Age: 26
End: 2021-05-17
Payer: COMMERCIAL

## 2021-05-17 DIAGNOSIS — F40.10 SOCIAL ANXIETY DISORDER: ICD-10-CM

## 2021-05-17 DIAGNOSIS — F45.21 ILLNESS ANXIETY DISORDER: Primary | ICD-10-CM

## 2021-05-24 ENCOUNTER — VIRTUAL VISIT (OUTPATIENT)
Dept: PSYCHIATRY | Facility: CLINIC | Age: 26
End: 2021-05-24
Payer: COMMERCIAL

## 2021-05-24 DIAGNOSIS — F33.41 MAJOR DEPRESSIVE DISORDER, RECURRENT EPISODE, IN PARTIAL REMISSION (H): ICD-10-CM

## 2021-05-24 DIAGNOSIS — F45.21 ILLNESS ANXIETY DISORDER: Primary | ICD-10-CM

## 2021-05-24 DIAGNOSIS — F40.10 SOCIAL ANXIETY DISORDER: ICD-10-CM

## 2021-05-24 NOTE — PROGRESS NOTES
"Department of Psychiatry  Diagnostic Evaluation      Date of Service: May 24, 2021  Duration of interview with patient: Start Time: 10:05a; Stop Time: 11:00a  Provider: Courtney Bunch, PhD,   Psychiatrist: None  PCP: Faina Hopkins  Other Providers: None  Referred by self     Identifying Data:  Gus Regalado is a 25 year old female who prefers the name of \"Clarisse\". The following information was obtained through a clinical interview, self-report questionnaires, and chart review. For more information, see Diagnostic Evaluation by Courtney Bunch, PhD on 12/3/2019.    Telehealth Video Visit AddOn  Type of service: Telemedicine Diagnostic Assessment for anxiety.  The patient's condition can be safely assessed and treated via synchronous audio and visual telemedicine encounter.    Mode of Communication:  Video Conference via [employer]  Reason for Telemedicine Visit: This patient visit was converted to a Telehealth video visit to minimize exposure to COVID-19.  Originating Site (Patient Location): Patient's home  Distant Site (Provider Location): Provider Remote Setting  Consent:  The patient has verbally consented to: the potential risks and benefits of telemedicine versus in person care with special emphasis on confidentiality given family members in the home.  Attestation: As the provider I attest to compliance with applicable laws and regulations related to telemedicine.    Encounter Diagnoses   Name Primary?     Illness anxiety disorder Yes     Social anxiety disorder      Major depressive disorder, recurrent episode, in partial remission (H)      Illness anxiety disorder: Fear of remigio infections, maria l. COVID-19, care-seeking type, with panic attacks     Assessment (current symptoms):  PHQ 2/10/2020 2/24/2020 3/9/2020   PHQ-9 Total Score 15 11 10   Q9: Thoughts of better off dead/self-harm past 2 weeks Several days Several days Several days     ANEL-7 SCORE 2/10/2020 2/24/2020 3/9/2020   Total Score 11 9 8     Today, " "patient reports some improvement in washing compulsions. Still high anxiety. :Lingering depression sxs including shame, disappointment, wanting to give up. Really bad (excruciating) stomach pain 5 days ago (last Th). Patient denies active suicide ideation, plan, and intent.    This episode: worst depression was Mid March of 2021 (3 weeks ago): very sad, more sad than panicky, strong ache in chest, very painful, physical pain, hard time breathing, de-realization (altered state, \"awareness level is different, looking at self from a different dimension\"), different time concept, despair    Updates since last visit:     Fully vaccinated as of 4/27    Completed HW to resist handwashing before shower and toilet and between face and genital.    Went on a car ride with partner for 40min (was challenging), did not wear mask in car, partner got gas and used hand .    Did not see Dr with stomach pain (maybe gastritis, gas), which she attributes to having avoided unnecessary Dr visit    Lost weight: now at her ideal weight (108lbs at 5'2\", down from 128lbs)    Treatment strategies:   - discussed ERP homework, praised for completing homework so well  - Explored the following topics utilizing cognitive-behavioral, acceptance-based and mindfulness techniques: distress during exposure exercises, fear of germs, contamination (excessive vs helpful)  - discussed new homework    HW: resist straying packages with Lysol, discontinue face shield  Shower:  continue with exposure to excessive hand washing (resist hand-washing while in shower; tolerate discomfort)    NEXT SESSION: residual depression; guilt, shame, wanting to give up when not accomplishing things    Social Exposure Exercises:    Jason ways - new people    Walking outside (social unrest)    Hierarchy of COVID- related Exposure Exercises (outside of apartment wears K95 + fabric mask + shield)    1. 4-6 - shower: no hand-washing while showering (made significant " progress)  2. 4 - driving in the car by self without mask (drove to pharmacy, and curb-side pick-up. masked)  3. 5-6 - driving with cousin (gualberto with partner, no mask, no shield, was able to touch him after he got gas and used only hand )  4. 5-6 Walking outside (with people nearby)  5. 6 - curbside pickup (7 - if I have to talk to them and roll down window)  6. 6-7 Getting in the elevator by herself  7. 7- Picking up a package from   8. 7-8 - seeing/ walking close by a smoker outsides  9. 8 Sharing an elevator with triple masks  10. 8 - shopping at grocery store    Compulsions (no particular order):    Re-assurance seeking from nurse hotline, sister    Getting tested    Shower handwashing rituals    Excessive hand-washing when outside of apartment    Washing / Applying Lysol to new items (food and otherwise)    Treatment Plan:     Complete between session activation assignments:    Continue CBT /ERP/ACT for Illness anxiety and depression    Re-visit medication management.     MENTAL STATUS EXAM                                                                                       Alertness: alert  and oriented  Appearance: well groomed  Behavior/Demeanor: polite, quiet, cooperative and pleasant with good  eye contact   Speech: regular rate and rhythm, low volume  Language: intact  Psychomotor: normal or unremarkable  Mood: anxious, worried, fearful  Affect: restricted; was congruent to mood; was congruent to content  Thought Process/Associations: unremarkable  Thought Content:  Reports worries;  Denies suicidal ideation and violent ideation  Perception: Denies auditory hallucinations and visual hallucinations  Insight: good  Judgment: good  Cognition: (6) does  appear grossly intact; formal cognitive testing was not done  Gait and Station: not assessed due to telehealth format    Plan     - Weekly cognitive behavioral therapy for illness anxiety and other anxiety, OC symptoms.  - Discussed and  agreed to the following goals for therapy:     Self-like    Tolerating of uncertainly    Able to go into the office    Expand range of activities    Reduce fear of infectous diseases and viruses  - RTC: 2 weeks  - ask for consent to LESVIA with PCP    Courtney Bunch, PhD. LP

## 2021-06-06 NOTE — PROGRESS NOTES
"Cognitive Behavior Therapy Progress Note      Date of Service: May 17, 2021  Duration of interview with patient: Start Time: 10:00a; Stop Time: 11:00a  Provider: Courtney Bunch, PhD,   Psychiatrist: None  PCP: Faina Hopkins  Other Providers: None  Referred by self     Identifying Data:  Gus Regalado is a 25 year old female who prefers the name of \"Clarisse\". The following information was obtained through a clinical interview, self-report questionnaires, and chart review. For more information, see Diagnostic Evaluation by Courtney Bunch, PhD on 12/3/2019.    Telehealth Video Visit AddOn  Type of service: Telemedicine Diagnostic Assessment for anxiety.  The patient's condition can be safely assessed and treated via synchronous audio and visual telemedicine encounter.    Mode of Communication:  Video Conference via [employer]  Reason for Telemedicine Visit: This patient visit was converted to a Telehealth video visit to minimize exposure to COVID-19.  Originating Site (Patient Location): Patient's home  Distant Site (Provider Location): Provider Remote Setting  Consent:  The patient has verbally consented to: the potential risks and benefits of telemedicine versus in person care with special emphasis on confidentiality given family members in the home.  Attestation: As the provider I attest to compliance with applicable laws and regulations related to telemedicine.    Encounter Diagnoses   Name Primary?     Illness anxiety disorder Yes     Social anxiety disorder      Illness anxiety disorder: Fear of remigio infections, maria l. COVID-19, care-seeking type, with panic attacks     Assessment (current symptoms):  PHQ 2/10/2020 2/24/2020 3/9/2020   PHQ-9 Total Score 15 11 10   Q9: Thoughts of better off dead/self-harm past 2 weeks Several days Several days Several days     ANEL-7 SCORE 2/10/2020 2/24/2020 3/9/2020   Total Score 11 9 8     Today, patient reports no sign improvement in fear of remigio infectious diseases " "with obsessions and compulsions. Intermittent dysphoria, anhedonia, behavioral avoidance, sleep disturbance, low energy, poor appetite, self-criticism, guilt, concentration impairment, anxiety. Patient denies active suicide ideation, plan, and intent.    This episode: worst depression was Mid March of 2021 (3 weeks ago): very sad, more sad than panicky, strong ache in chest, very painful, physical pain, hard time breathing, de-realization (altered state, \"awareness level is different, looking at self from a different dimension\"), different time concept, despair    Updates since last visit:     Had cancer sore in mouth, pt was obsessing over it being a cold sore. Called nurse at PCP once (but was weekend), nurse triage line at [facility] for STD x3 (went in 3x).  compulsive cleaning (wiping skin with alcohol wipes    Anxiety was high and wasn't able to work    Also got COVID test    Excessive handwashing prior to using toilet    Treatment strategies:   - Behavior Therapy for anxiety / Exposure and Response Prevention for OCD  - reviewed homework. Pt had completed assignment successfully; discussed new homework    HW: continue with excessive hand washing  Prev HW: Shower: resist hand-washing while in shower; tolerate discomfort    Social Exposure Exercises:    Jason ways - new people    Walking outside (social unrest)    Hierarchy of COVID- related Exposure Exercises (outside of apartment wears K95 + fabric mask + shield)    1. 4-6 - shower: no hand-washing while showering (made significant progress)  2. 4 - driving in the car by self without mask (drove to pharmacy, and curb-side pick-up. masked)  3. 5-6 - driving with cousin (rode with partner, no mask, no shield, was able to touch him after he got gas and used only hand )  4. 5-6 Walking outside (with people nearby)  5. 6 - curbside pickup (7 - if I have to talk to them and roll down window)  6. 6-7 Getting in the elevator " by herself  7. 7- Picking up a package from   8. 7-8 - seeing/ walking close by a smoker outsides  9. 8 Sharing an elevator with triple masks  10. 8 - shopping at grocery store    Compulsions (no particular order):    Re-assurance seeking from nurse hotline, sister    Getting tested    Shower handwashing rituals    Excessive hand-washing when outside of apartment    Washing / Applying Lysol to new items (food and otherwise)    Treatment Plan:     Complete between session activation assignments:    Continue CBT /ERP/ACT for Illness anxiety and depression    Re-visit medication management.     MENTAL STATUS EXAM                                                                                       Alertness: alert  and oriented  Appearance: well groomed  Behavior/Demeanor: polite, quiet, cooperative and pleasant with good  eye contact   Speech: regular rate and rhythm, low volume  Language: intact  Psychomotor: normal or unremarkable  Mood: anxious, worried, fearful  Affect: restricted; was congruent to mood; was congruent to content  Thought Process/Associations: unremarkable  Thought Content:  Reports worries;  Denies suicidal ideation and violent ideation  Perception: Denies auditory hallucinations and visual hallucinations  Insight: good  Judgment: good  Cognition: (6) does  appear grossly intact; formal cognitive testing was not done  Gait and Station: not assessed due to telehealth format    Plan     - Weekly cognitive behavioral therapy for illness anxiety and other anxiety, OC symptoms.  - Discussed and agreed to the following goals for therapy:     Self-like    Tolerating of uncertainly    Able to go into the office    Expand range of activities    Reduce fear of infectous diseases and viruses  - RTC: 2 weeks  - ask for consent to LESVIA with PCP  - Discuss referral for psychiatric medication evaluation    Courtney Bunch, PhD. LP

## 2021-06-07 ENCOUNTER — VIRTUAL VISIT (OUTPATIENT)
Dept: PSYCHIATRY | Facility: CLINIC | Age: 26
End: 2021-06-07
Payer: COMMERCIAL

## 2021-06-07 DIAGNOSIS — F40.10 SOCIAL ANXIETY DISORDER: ICD-10-CM

## 2021-06-07 DIAGNOSIS — F45.21 ILLNESS ANXIETY DISORDER: Primary | ICD-10-CM

## 2021-06-07 NOTE — PROGRESS NOTES
"Psychotherapy Progress Note    Date of Service: Jun 7, 2021  Duration of interview with patient: Start Time: 10:05a; Stop Time: 11:00a  Provider: Courtney Bunch, PhD,   Psychiatrist: None  PCP: Faina Hopkins  Other Providers: None  Referred by self     Identifying Data:  Gus Regalado is a 25 year old female who prefers the name of \"Clarisse\". The following information was obtained through a clinical interview, self-report questionnaires, and chart review. For more information, see Diagnostic Evaluation by Courtney Bunch, PhD on 12/3/2019.    Telehealth Video Visit AddOn  Type of service: Telemedicine Diagnostic Assessment for anxiety.  The patient's condition can be safely assessed and treated via synchronous audio and visual telemedicine encounter.    Mode of Communication:  Video Conference via [employer]  Reason for Telemedicine Visit: This patient visit was converted to a Telehealth video visit to minimize exposure to COVID-19.  Originating Site (Patient Location): Patient's home  Distant Site (Provider Location): Provider Remote Setting  Consent:  The patient has verbally consented to: the potential risks and benefits of telemedicine versus in person care with special emphasis on confidentiality given family members in the home.  Attestation: As the provider I attest to compliance with applicable laws and regulations related to telemedicine.    Encounter Diagnoses   Name Primary?     Illness anxiety disorder Yes     Social anxiety disorder      Illness anxiety disorder: Fear of remigio infections, maria l. COVID-19, care-seeking type, with panic attacks     Assessment (current symptoms):  PHQ 2/10/2020 2/24/2020 3/9/2020   PHQ-9 Total Score 15 11 10   Q9: Thoughts of better off dead/self-harm past 2 weeks Several days Several days Several days     ANEL-7 SCORE 2/10/2020 2/24/2020 3/9/2020   Total Score 11 9 8     Today, patient reports some improvement in washing compulsions. Still high anxiety. :Lingering depression " "sxs including shame, disappointment, wanting to give up. Really bad (excruciating) stomach pain 5 days ago (last Th). Patient endorsed thoughts of \"not wanting to exist\" for 5-6 days of last 14. denies active suicide ideation, plan, and intent.    Updates since last visit:     Had 2 medical appointments, was more able to do it, but     Was still wearing face shield to Dr office    was still scared of sitting on toilet, using TP, germs from floor (barefoot)    Ditched face shield aside from Dr visit    Still wiping packages, but no longer washes hands between wiping packaging.    reduced handwashing less cuts    Vitamin D deficiency, finished 12 weeks of using supplements    Started taking multivitatmine    Had first meal in car (had to my mask off)    Treatment strategies:   - discussed ERP homework and celebrated her progress.   - Explored the following topics utilizing cognitive-behavioral, acceptance-based and mindfulness techniques: distress during exposure exercises, fear of germs, contamination (excessive vs helpful)  - discussed new homework    HW: resist straying packages with Lysol, discontinue face shield  Prev HW: Shower:  continue with exposure to excessive hand washing (resist hand-washing while in shower; tolerate discomfort)    NEXT SESSION: residual depression; guilt, shame, wanting to give up when not accomplishing things    Social Exposure Exercises:    Jason ways - new people    Walking outside (social unrest)    Hierarchy of COVID- related Exposure Exercises (outside of apartment wears K95 + fabric mask + shield)    1. 4-6 - shower: no hand-washing while showering (made significant progress)  2. 4 - driving in the car by self without mask (drove to pharmacy, and curb-side pick-up. masked)  3. 5-6 - driving with cousin (gualberto with partner, no mask, no shield, was able to touch him after he got gas and used only hand )  4. 5-6 Walking outside (with people nearby)  5. 6 - curbside pickup (7 - " if I have to talk to them and roll down window)  6. 6-7 Getting in the elevator by herself  7. 7- Picking up a package from   8. 7-8 - seeing/ walking close by a smoker outsides  9. 8 Sharing an elevator with triple masks  10. 8 - shopping at grocery store    Compulsions (no particular order):    Re-assurance seeking from nurse hotline, sister    Getting tested    Shower handwashing rituals    Excessive hand-washing when outside of apartment    Washing / Applying Lysol to new items (food and otherwise)    Treatment Plan:     Complete between session activation assignments:    Continue CBT /ERP/ACT for Illness anxiety and depression    Re-visit medication management.     MENTAL STATUS EXAM                                                                                       Alertness: alert  and oriented  Appearance: well groomed  Behavior/Demeanor: polite, quiet, cooperative and pleasant with good  eye contact   Speech: regular rate and rhythm, low volume  Language: intact  Psychomotor: normal or unremarkable  Mood: anxious, worried, fearful  Affect: restricted; was congruent to mood; was congruent to content  Thought Process/Associations: unremarkable  Thought Content:  Reports worries;  Denies suicidal ideation and violent ideation  Perception: Denies auditory hallucinations and visual hallucinations  Insight: good  Judgment: good  Cognition: (6) does  appear grossly intact; formal cognitive testing was not done  Gait and Station: not assessed due to telehealth format    Plan     - Weekly cognitive behavioral therapy for illness anxiety and other anxiety, OC symptoms.  - Discussed and agreed to the following goals for therapy:     Self-like    Tolerating of uncertainly    Able to go into the office    Expand range of activities    Reduce fear of infectous diseases and viruses  - RTC: 2 weeks  - revisit psych med eval (pt declined for now)    Courtney Bunch, PhD. TEGAN

## 2021-06-14 ENCOUNTER — VIRTUAL VISIT (OUTPATIENT)
Dept: PSYCHIATRY | Facility: CLINIC | Age: 26
End: 2021-06-14
Payer: COMMERCIAL

## 2021-06-14 ENCOUNTER — TELEPHONE (OUTPATIENT)
Dept: PSYCHIATRY | Facility: CLINIC | Age: 26
End: 2021-06-14

## 2021-06-14 DIAGNOSIS — F40.10 SOCIAL ANXIETY DISORDER: ICD-10-CM

## 2021-06-14 DIAGNOSIS — F33.41 MAJOR DEPRESSIVE DISORDER, RECURRENT EPISODE, IN PARTIAL REMISSION (H): ICD-10-CM

## 2021-06-14 DIAGNOSIS — F45.21 ILLNESS ANXIETY DISORDER: Primary | ICD-10-CM

## 2021-06-14 NOTE — TELEPHONE ENCOUNTER
What is the concern that needs to be addressed by a nurse? Pt called confused, during visit last week on 6/7/21 Dr. Bunch had told them they would start weekly appts, but there is nothing scheduled today. Please call t back asap.     May a detailed message be left on voicemail? yes    Date of last office visit: 6/7/21    Message routed to: psych rn pool

## 2021-06-14 NOTE — PROGRESS NOTES
"Psychotherapy Progress Note    Date of Service: Jun 14, 2021  Duration of interview with patient: Start Time: 4:30p; Stop Time: 5:30p  Provider: Courtney Bunch, PhD,   Psychiatrist: None  PCP: Faina Hopkins  Other Providers: None  Referred by self     Identifying Data:  Gus Regalado is a 25 year old female who prefers the name of \"Clarisse\". The following information was obtained through a clinical interview, self-report questionnaires, and chart review. For more information, see Diagnostic Evaluation by Courtney Bunch, PhD on 12/3/2019.    Telehealth Video Visit AddOn  Type of service: Telemedicine Diagnostic Assessment for anxiety.  The patient's condition can be safely assessed and treated via synchronous audio and visual telemedicine encounter.    Mode of Communication:  Video Conference via [employer]  Reason for Telemedicine Visit: This patient visit was converted to a Telehealth video visit to minimize exposure to COVID-19.  Originating Site (Patient Location): Patient's home  Distant Site (Provider Location): Provider Remote Setting  Consent:  The patient has verbally consented to: the potential risks and benefits of telemedicine versus in person care with special emphasis on confidentiality given family members in the home.  Attestation: As the provider I attest to compliance with applicable laws and regulations related to telemedicine.    Encounter Diagnoses   Name Primary?     Illness anxiety disorder Yes     Social anxiety disorder      Major depressive disorder, recurrent episode, in partial remission (H)      Illness anxiety disorder: Fear of remigio infections, maria l. COVID-19, care-seeking type, with panic attacks     Assessment (current symptoms):  PHQ 2/10/2020 2/24/2020 3/9/2020   PHQ-9 Total Score 15 11 10   Q9: Thoughts of better off dead/self-harm past 2 weeks Several days Several days Several days     ANEL-7 SCORE 2/10/2020 2/24/2020 3/9/2020   Total Score 11 9 8     Today, patient reports some " [facility]... improvement in washing compulsions. Continues to improve in illness anxiety/ompulsions. Denies active suicide ideation, plan, and intent.    Updates since last visit:     Has become more comfortable with leaving the apartment    Had first outdoor dining experience, was able to eat (looked at food, not at other people)    Discontinued face shield except for hospital, wears 2 masks most the time, was able to reduce to 1 mask when staying in car    Discontinued Lysol spray entirely, still wipes occasioanlly    Ate meal with family    Eating more, less obsessing about calories and weight gain. When hungry, allows self to eat during lunchtime    Does not wash hands in shower between parts (except before face and privates)    Treatment strategies:   - discussed ERP homework and praised for her hard work  - Explored the following topics utilizing cognitive-behavioral, acceptance-based and mindfulness techniques: distress during exposure exercises, fear of germs, contamination (excessive vs helpful)  - used to feel proud of being able to eat less than 800 calories /day, now does not have that goal anymore    discussed new homework:     Further reduce Lysol wipes    Reduce handwashing after touching self (e.g., scratching ear) or objects in pt's appartment (currently washes approx. 30x without leaving apartmetn)    Keep hand-washing for 20 sec total after toilet, before eat    Eliminate mirror     NEXT SESSION: residual depression; guilt, shame, wanting to give up when not accomplishing things    Social Exposure Exercises:    Jason ways - new people    Walking outside (social unrest)    Hierarchy of COVID- related Exposure Exercises (outside of apartment wears K95 + fabric mask + shield)    1. 4-6 - shower: no hand-washing while showering (made significant progress)  2. 4 - driving in the car by self without mask (drove to pharmacy, and curb-side pick-up. masked)  3. 5-6 - driving with cousin (rode with partner, no mask, no  shield, was able to touch him after he got gas and used only hand )  4. 5-6 Walking outside (with people nearby)  5. 6 - curbside pickup (7 - if I have to talk to them and roll down window)  6. 6-7 Getting in the elevator by herself  7. 7- Picking up a package from   8. 7-8 - seeing/ walking close by a smoker outsides  9. 8 Sharing an elevator with triple masks  10. 8 - shopping at grocery store    Compulsions (no particular order):    Re-assurance seeking from nurse hotline, sister    Getting tested    Shower handwashing rituals    Excessive hand-washing when outside of apartment    Washing / Applying Lysol to new items (food and otherwise)    Treatment Plan:     Complete between session activation assignments:    Continue CBT /ERP/ACT for Illness anxiety and depression    Re-visit medication management.     MENTAL STATUS EXAM                                                                                       Alertness: alert  and oriented  Appearance: well groomed  Behavior/Demeanor: polite, quiet, cooperative and pleasant with good  eye contact   Speech: regular rate and rhythm, low volume  Language: intact  Psychomotor: normal or unremarkable  Mood: anxious, worried,   Affect: restricted; was congruent to mood; was congruent to content  Thought Process/Associations: unremarkable  Thought Content:  Reports worries;  Denies suicidal ideation and violent ideation  Perception: Denies auditory hallucinations and visual hallucinations  Insight: good  Judgment: good  Cognition: (6) does  appear grossly intact; formal cognitive testing was not done  Gait and Station: not assessed due to telehealth format    Plan     - Weekly cognitive behavioral therapy for illness anxiety and other anxiety, OC symptoms.  - Discussed and agreed to the following goals for therapy:     Self-like    Tolerating of uncertainly    Able to go into the office    Expand range of activities    Reduce fear of infectous  diseases and viruses  - RTC: weekly    The author of this note documented a reason for not sharing it with the patient.      Courtney Bunch, . LP

## 2021-06-21 ENCOUNTER — VIRTUAL VISIT (OUTPATIENT)
Dept: PSYCHIATRY | Facility: CLINIC | Age: 26
End: 2021-06-21
Payer: COMMERCIAL

## 2021-06-21 DIAGNOSIS — F40.10 SOCIAL ANXIETY DISORDER: ICD-10-CM

## 2021-06-21 DIAGNOSIS — F45.21 ILLNESS ANXIETY DISORDER: Primary | ICD-10-CM

## 2021-06-21 DIAGNOSIS — F33.1 MODERATE RECURRENT MAJOR DEPRESSION (H): ICD-10-CM

## 2021-06-21 NOTE — PROGRESS NOTES
"Psychotherapy Progress Note    Date of Service: Jun 21, 2021  Duration of interview with patient: Start Time: 10:10a; Stop Time: 11:03a  Provider: Courtney Bunch, PhD,   Psychiatrist: None  PCP: Faina Hopkins  Other Providers: None  Referred by self     Identifying Data:  Gus Regalado is a 25 year old female who prefers the name of \"Clarisse\". The following information was obtained through a clinical interview, self-report questionnaires, and chart review. For more information, see Diagnostic Evaluation by Courtney Bunch, PhD on 12/3/2019.    Telehealth Video Visit AddOn  Type of service: Telemedicine Diagnostic Assessment for anxiety.  The patient's condition can be safely assessed and treated via synchronous audio and visual telemedicine encounter.    Mode of Communication:  Video Conference via [employer]  Reason for Telemedicine Visit: This patient visit was converted to a Telehealth video visit to minimize exposure to COVID-19.  Originating Site (Patient Location): Patient's home  Distant Site (Provider Location): Provider Remote Setting  Consent:  The patient has verbally consented to: the potential risks and benefits of telemedicine versus in person care with special emphasis on confidentiality given family members in the home.  Attestation: As the provider I attest to compliance with applicable laws and regulations related to telemedicine.    Encounter Diagnoses   Name Primary?     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 COVID-19, care-seeking type, with panic attacks     Assessment (current symptoms):  PHQ 2/10/2020 2/24/2020 3/9/2020   PHQ-9 Total Score 15 11 10   Q9: Thoughts of better off dead/self-harm past 2 weeks Several days Several days Several days     ANEL-7 SCORE 2/10/2020 2/24/2020 3/9/2020   Total Score 11 9 8     Today, patient reports, mood was middle to hopeless. " "Yesterday, felt lonely.    Updates since last visit:   + completed HW partly: Did not wipe down groceries (with Lysol) yesterday    Has colonoscopy tomorrow (due to rectal bleeding); did not feel comfortable to ask friend or family member to dirve    Saw dermatologist for eczemas on hands due to excessive handwashing    Treatment strategies:     Explored the following topics utilizing cognitive-behavioral, ERP and  acceptance-based techniques    residual depression; guilt, shame, wanting to give up when not accomplishing things    \"I choose solitude\" due to social interactions feeling too complex and exhausting - > explored thoughts, feelings about being a burden, \"everything feels artificial, I don't trust others pos response b/c I put on a good face\"    Discussed social exposures as worthwhile investment into a better future, I.e.., Clarisse does have healthy drive to socialize    Discomfort and fears related to upcoming coloscopy and need to ask for a      Discussed my upcoming absence in 2 weeks    continue homework:     Further reduce Lysol wipes    Reduce handwashing after touching self (e.g., scratching ear) or objects in pt's appartment (currently washes approx. 30x without leaving apartmetn)    Keep hand-washing for 20 sec total after toilet, before eat    Eliminate mirror    Social Exposure Exercises:    Jason ways - new people    Walking outside (social unrest)    Hierarchy of COVID- related Exposure Exercises (outside of apartment wears K95 + fabric mask + shield)    1. 4-6 - shower: no hand-washing while showering (made significant progress)  2. 4 - driving in the car by self without mask (drove to pharmacy, and curb-side pick-up. masked)  3. 5-6 - driving with cousin (gualberto with partner, no mask, no shield, was able to touch him after he got gas and used only hand )  4. 5-6 Walking outside (with people nearby)  5. 6 - curbside pickup (7 - if I have to talk to them and roll down window)  6. 6-7 " Getting in the elevator by herself  7. 7- Picking up a package from   8. 7-8 - seeing/ walking close by a smoker outsides  9. 8 Sharing an elevator with triple masks  10. 8 - shopping at grocery store    Compulsions (no particular order):    Re-assurance seeking from nurse hotline, sister    Getting tested    Shower handwashing rituals    Excessive hand-washing when outside of apartment    Washing / Applying Lysol to new items (food and otherwise)    Treatment Plan:     Complete between session activation assignments:    Continue CBT /ERP/ACT for Illness anxiety and depression    Re-visit medication management.     MENTAL STATUS EXAM                                                                                       Alertness: alert  and oriented  Appearance: well groomed  Behavior/Demeanor: polite, quiet, cooperative and pleasant with good  eye contact   Speech: regular rate and rhythm, low volume  Language: intact  Psychomotor: normal or unremarkable  Mood: anxious, worried,   Affect: restricted; was congruent to mood; was congruent to content  Thought Process/Associations: unremarkable  Thought Content:  Reports worries;  Denies suicidal ideation and violent ideation  Perception: Denies auditory hallucinations and visual hallucinations  Insight: good  Judgment: good  Cognition: (6) does  appear grossly intact; formal cognitive testing was not done  Gait and Station: not assessed due to telehealth format    Plan     - Weekly cognitive behavioral therapy for illness anxiety and other anxiety, OC symptoms.  - Discussed and agreed to the following goals for therapy:     Self-like    Tolerating of uncertainly    Able to go into the office    Expand range of activities    Reduce fear of infectous diseases and viruses  - RTC: weekly      Courtney Bunch, PhD. LP

## 2021-06-28 ENCOUNTER — VIRTUAL VISIT (OUTPATIENT)
Dept: PSYCHIATRY | Facility: CLINIC | Age: 26
End: 2021-06-28
Payer: COMMERCIAL

## 2021-06-28 DIAGNOSIS — F45.21 ILLNESS ANXIETY DISORDER: Primary | ICD-10-CM

## 2021-06-28 DIAGNOSIS — F33.1 MODERATE RECURRENT MAJOR DEPRESSION (H): ICD-10-CM

## 2021-06-28 NOTE — PROGRESS NOTES
"Psychotherapy Progress Note    Date of Service: Jun 28, 2021  Duration of interview with patient: Start Time: 10:05a; Stop Time: 11:02a  Provider: Courtney Bunch, PhD,   Psychiatrist: None  PCP: Faina Hopkins  Other Providers: None  Referred by self     Identifying Data:  Gus Regalado is a 25 year old female who prefers the name of \"Clarisse\". The following information was obtained through a clinical interview, self-report questionnaires, and chart review. For more information, see Diagnostic Evaluation by Courtney Bunch, PhD on 12/3/2019.    Telehealth Video Visit AddOn  Type of service: Telemedicine Diagnostic Assessment for anxiety.  The patient's condition can be safely assessed and treated via synchronous audio and visual telemedicine encounter.    Mode of Communication:  Video Conference via [employer]  Reason for Telemedicine Visit: This patient visit was converted to a Telehealth video visit to minimize exposure to COVID-19.  Originating Site (Patient Location): Patient's home  Distant Site (Provider Location): Provider Remote Setting  Consent:  The patient has verbally consented to: the potential risks and benefits of telemedicine versus in person care with special emphasis on confidentiality given family members in the home.  Attestation: As the provider I attest to compliance with applicable laws and regulations related to telemedicine.    Encounter Diagnoses   Name Primary?     Illness anxiety disorder Yes     Moderate recurrent major depression (H)      Illness anxiety disorder: Fear of remigio infections, maria l. COVID-19, care-seeking type, with panic attacks     Assessment (current symptoms):  PHQ 2/10/2020 2/24/2020 3/9/2020   PHQ-9 Total Score 15 11 10   Q9: Thoughts of better off dead/self-harm past 2 weeks Several days Several days Several days     ANEL-7 SCORE 2/10/2020 2/24/2020 3/9/2020   Total Score 11 9 8     Today, patient reports, mood was middle to hopeless. Yesterday, felt lonely.    Updates " "since last visit:     Colonoscopy went well.    completed HW completely. Went above goals.    Able to maintain benefit: Did not wipe down groceries (with Lysol) yesterday    Got oil change and [employer] (masked); most of public wasn't wearing masks anymore, dropped off bday cake and able to sit and eat cake; anxiety was up, able to tolerate it    Fear of having Herpes Simplex (cold sores)    Treatment strategies:     Explored the following topics utilizing cognitive-behavioral, ERP and  acceptance-based techniques    Homework, accomplishment    Obsessions related to herpes: fear of giving it to partner, friend, etc,\"I would not forgive myself\"    Guilt re ex-bf, my anxiety drove him away, asked for re-assurance a lot    Connected current anxiety to past/childhood data: Separation anxiety and fear of abandonment, fear of loosing partner    Discussed my upcoming absence and coverage    continue homework:     Further reduce Lysol wipes    Reduce handwashing after touching self (e.g., scratching ear) or objects in pt's appartment (currently washes approx. 30x without leaving apartmetn)    Keep hand-washing for 20 sec total after toilet, before eat    Eliminate mirror    Social Exposure Exercises:    Jason ways - new people    Walking outside (social unrest)    Hierarchy of COVID- related Exposure Exercises (outside of apartment wears K95 + fabric mask + shield)    1. 4-6 - shower: no hand-washing while showering (made significant progress)  2. 4 - driving in the car by self without mask (drove to pharmacy, and curb-side pick-up. masked)  3. 5-6 - driving with cousin (rode with partner, no mask, no shield, was able to touch him after he got gas and used only hand )  4. 5-6 Walking outside (with people nearby)  5. 6 - curbside pickup (7 - if I have to talk to them and roll down window)  6. 6-7 Getting in the elevator by herself  7. 7- Picking up a package from   8. 7-8 - seeing/ walking close by a smoker " outsides  9. 8 Sharing an elevator with triple masks  10. 8 - shopping at grocery store    Compulsions (no particular order):    Re-assurance seeking from nurse hotline, sister    Getting tested    Shower handwashing rituals    Excessive hand-washing when outside of apartment    Washing / Applying Lysol to new items (food and otherwise)    Treatment Plan:     Complete between session activation assignments:    Continue CBT /ERP/ACT for Illness anxiety and depression    Re-visit medication management.     MENTAL STATUS EXAM                                                                                       Alertness: alert  and oriented  Appearance: well groomed  Behavior/Demeanor: polite, quiet, cooperative and pleasant with good  eye contact   Speech: regular rate and rhythm, low volume  Language: intact  Psychomotor: normal or unremarkable  Mood: anxious, worried,   Affect: restricted; was congruent to mood; was congruent to content  Thought Process/Associations: unremarkable  Thought Content:  Reports worries;  Denies suicidal ideation and violent ideation  Perception: Denies auditory hallucinations and visual hallucinations  Insight: good  Judgment: good  Cognition: (6) does  appear grossly intact; formal cognitive testing was not done  Gait and Station: not assessed due to telehealth format    Plan     - Weekly cognitive behavioral therapy for illness anxiety and other anxiety, OC symptoms.  - Discussed and agreed to the following goals for therapy:     Self-like    Tolerating of uncertainly    Able to go into the office    Expand range of activities    Reduce fear of infectous diseases and viruses  - RTC: weekly      Courtney Bunch, PhD. LP

## 2021-07-19 ENCOUNTER — VIRTUAL VISIT (OUTPATIENT)
Dept: PSYCHIATRY | Facility: CLINIC | Age: 26
End: 2021-07-19
Payer: COMMERCIAL

## 2021-07-19 DIAGNOSIS — F33.1 MODERATE RECURRENT MAJOR DEPRESSION (H): ICD-10-CM

## 2021-07-19 DIAGNOSIS — F40.10 SOCIAL ANXIETY DISORDER: ICD-10-CM

## 2021-07-19 DIAGNOSIS — F45.21 ILLNESS ANXIETY DISORDER: Primary | ICD-10-CM

## 2021-07-19 NOTE — PROGRESS NOTES
"Clinician Contact & Progress Note  Department of Psychiatry & Behavioral Sciences  [facility]      Date of Service: Jul 19, 2021  Duration of interview with patient: Start Time: 3:00p; Stop Time: 4:00p  Provider: Courtney Bunch, PhD,   Psychiatrist: None  PCP: Faina Hopkins  Other Providers: None  Referred by self     Identifying Data:  Gus Regalado is a 26 year old female who prefers the name of \"Clarisse\". The following information was obtained through a clinical interview, self-report questionnaires, and chart review. For more information, see Diagnostic Evaluation by Courtney Bunch, PhD on 12/3/2019.    Telehealth Video Visit AddOn  Type of service: Telemedicine Diagnostic Assessment for anxiety.  The patient's condition can be safely assessed and treated via synchronous audio and visual telemedicine encounter.    Mode of Communication:  Video Conference via [employer]  Reason for Telemedicine Visit: This patient visit was converted to a Telehealth video visit to minimize exposure to COVID-19.  Originating Site (Patient Location): Patient's home  Distant Site (Provider Location): Provider Remote Setting  Consent:  The patient has verbally consented to: the potential risks and benefits of telemedicine versus in person care with special emphasis on confidentiality given family members in the home.  Attestation: As the provider I attest to compliance with applicable laws and regulations related to telemedicine.    Encounter Diagnoses   Name Primary?     Illness anxiety disorder Yes     Moderate recurrent major depression (H)      Social anxiety disorder      Illness anxiety disorder: Fear of remigio infections, maria l. COVID-19, care-seeking type, with panic attacks     Assessment (current symptoms):  PHQ 2/10/2020 2/24/2020 3/9/2020   PHQ-9 Total Score 15 11 10   Q9: Thoughts of better off dead/self-harm past 2 weeks Several days Several days Several days     ANEL-7 SCORE 2/10/2020 2/24/2020 " 3/9/2020   Total Score 11 9 8     Today, patient reports stable mood, steady progress on reducing compulsions.    Updates since last visit:     Was able to take off mask for picture    Has been able to limit handwashing further.    Still has fear of cold sores.    Has had some SI    Treatment strategies:     Explored the above topics utilizing cognitive-behavioral, ERP and  acceptance-based techniques    Reviewed prev Homework, accomplishments    Discussed obsessions related to cold sores    continue homework:     Further reduce Lysol wipes    Reduce handwashing after touching self (e.g., scratching ear) or objects in pt's appartment (currently washes approx. 30x without leaving apartmetn)    Keep hand-washing for 20 sec total after toilet, before eat    Eliminate mirror    Social Exposure Exercises:    Jason ways - new people    Walking outside (social unrest)    Hierarchy of COVID- related Exposure Exercises (outside of apartment wears K95 + fabric mask + shield)    1. 4-6 - shower: no hand-washing while showering (made significant progress)  2. 4 - driving in the car by self without mask (drove to pharmacy, and curb-side pick-up. masked)  3. 5-6 - driving with cousin (rode with partner, no mask, no shield, was able to touch him after he got gas and used only hand )  4. 5-6 Walking outside (with people nearby)  5. 6 - curbside pickup (7 - if I have to talk to them and roll down window)  6. 6-7 Getting in the elevator by herself  7. 7- Picking up a package from   8. 7-8 - seeing/ walking close by a smoker outsides  9. 8 Sharing an elevator with triple masks  10. 8 - shopping at grocery store    Compulsions (no particular order):    Re-assurance seeking from nurse hotline, sister    Getting tested    Shower handwashing rituals    Excessive hand-washing when outside of apartment    Washing / Applying Lysol to new items (food and otherwise)    Treatment Plan:     Complete between session activation  assignments:    Continue CBT /ERP/ACT for Illness anxiety and depression    Re-visit medication management.     MENTAL STATUS EXAM                                                                                       Alertness: alert  and oriented  Appearance: well groomed  Behavior/Demeanor: polite, quiet, cooperative and pleasant with good  eye contact   Speech: regular rate and rhythm, low volume  Language: intact  Psychomotor: normal or unremarkable  Mood: anxious, worried,   Affect: restricted; was congruent to mood; was congruent to content  Thought Process/Associations: unremarkable  Thought Content:  Reports worries;  Denies suicidal ideation and violent ideation  Perception: Denies auditory hallucinations and visual hallucinations  Insight: good  Judgment: good  Cognition: (6) does  appear grossly intact; formal cognitive testing was not done  Gait and Station: not assessed due to telehealth format    Plan     - Weekly cognitive behavioral therapy for illness anxiety and other anxiety, OC symptoms.  - Discussed and agreed to the following goals for therapy:     Self-like    Tolerating of uncertainly    Able to go into the office    Expand range of activities    Reduce fear of infectous diseases and viruses  - RTC: weekly      Courtney Bunch, PhD. LP

## 2021-07-26 ENCOUNTER — VIRTUAL VISIT (OUTPATIENT)
Dept: PSYCHIATRY | Facility: CLINIC | Age: 26
End: 2021-07-26
Payer: COMMERCIAL

## 2021-07-26 DIAGNOSIS — F33.1 MODERATE RECURRENT MAJOR DEPRESSION (H): ICD-10-CM

## 2021-07-26 DIAGNOSIS — F40.10 SOCIAL ANXIETY DISORDER: ICD-10-CM

## 2021-07-26 DIAGNOSIS — F45.21 ILLNESS ANXIETY DISORDER: Primary | ICD-10-CM

## 2021-07-26 NOTE — PROGRESS NOTES
"Clinician Contact & Progress Note  Department of Psychiatry & Behavioral Sciences  [facility]      Date of Service: Jul 26, 2021  Duration of interview with patient: Start Time: 10a; Stop Time: 11a  Provider: Courtney Bunch, PhD,   Psychiatrist: None  PCP: Faina Hopkins  Other Providers: None  Referred by self     Identifying Data:  Gus Regalado is a 26 year old female who prefers the name of \"Clarisse\". The following information was obtained through a clinical interview, self-report questionnaires, and chart review. For more information, see Diagnostic Evaluation by Courtney Bunch, PhD on 12/3/2019.    Telehealth Video Visit AddOn  Type of service: Telemedicine Diagnostic Assessment for anxiety.  The patient's condition can be safely assessed and treated via synchronous audio and visual telemedicine encounter.    Mode of Communication:  Video Conference via [employer]  Reason for Telemedicine Visit: This patient visit was converted to a Telehealth video visit to minimize exposure to COVID-19.  Originating Site (Patient Location): Patient's home  Distant Site (Provider Location): Provider Remote Setting  Consent:  The patient has verbally consented to: the potential risks and benefits of telemedicine versus in person care with special emphasis on confidentiality given family members in the home.  Attestation: As the provider I attest to compliance with applicable laws and regulations related to telemedicine.    Encounter Diagnoses   Name Primary?     Illness anxiety disorder Yes     Moderate recurrent major depression (H)      Social anxiety disorder      Illness anxiety disorder: Fear of remigio infections, maria l. COVID-19, care-seeking type, with panic attacks     Assessment (current symptoms):  PHQ 2/10/2020 2/24/2020 3/9/2020   PHQ-9 Total Score 15 11 10   Q9: Thoughts of better off dead/self-harm past 2 weeks Several days Several days Several days     ANEL-7 SCORE 2/10/2020 2/24/2020 " "3/9/2020   Total Score 11 9 8     Today, patient reports \"doing ok\", steady progress on reducing compulsions.    Updates since last visit:     Still have irrational fear of cold sores, spreading; still consistently check mirror on whether I have signs, difficult to eat (fear of spreading virus to utensils)    Doing better with COVID overall, but more concerned re Delta variant: can take out trash without mask; rode elevator without mask with somebody else who was not wearing a mask    Treatment strategies:     Explored the above topics utilizing cognitive-behavioral, ERP and  acceptance-based techniques    obsessions related to cold sores    Coping with uncertainty: don't even like playing [employer], can't invest in stocks (cannot face the risk)    Reviewed prev Homework and accomplishments; assigned new HW    Homework:     Further reduce Lysol wipes    Reduce handwashing after touching self (e.g., scratching ear) or objects in pt's appartment (currently washes approx. 30x without leaving apartmetn)    Keep hand-washing for 20 sec total after toilet, before eat    Eliminate mirror    Social Exposure Exercises:    [place] ways - new people    Walking outside (social unrest)    Hierarchy of COVID- related Exposure Exercises (outside of apartment wears K95 + fabric mask + shield)    1. 4-6 - shower: no hand-washing while showering (made significant progress)  2. 4 - driving in the car by self without mask (drove to pharmacy, and curb-side pick-up. masked)  3. 5-6 - driving with cousin (gualberto with partner, no mask, no shield, was able to touch him after he got gas and used only hand )  4. 5-6 Walking outside (with people nearby)  5. 6 - curbside pickup (7 - if I have to talk to them and roll down window)  6. 6-7 Getting in the elevator by herself  7. 7- Picking up a package from   8. 7-8 - seeing/ walking close by a smoker outsides  9. 8 Sharing an elevator with triple masks  10. 8 - shopping at grocery " store    Compulsions (no particular order):    Re-assurance seeking from nurse hotline, sister    Getting tested    Shower handwashing rituals    Excessive hand-washing when outside of apartment    Washing / Applying Lysol to new items (food and otherwise)    Treatment Plan:     Complete between session activation assignments:    Continue CBT /ERP/ACT for Illness anxiety and depression    Re-visit medication management.     MENTAL STATUS EXAM                                                                                       Alertness: alert  and oriented  Appearance: well groomed  Behavior/Demeanor: polite, quiet, cooperative and pleasant with good  eye contact   Speech: regular rate and rhythm, low volume  Language: intact  Psychomotor: normal or unremarkable  Mood: anxious, worried,   Affect: restricted; was congruent to mood; was congruent to content  Thought Process/Associations: unremarkable  Thought Content:  Reports worries;  Denies suicidal ideation and violent ideation  Perception: Denies auditory hallucinations and visual hallucinations  Insight: good  Judgment: good  Cognition: (6) does  appear grossly intact; formal cognitive testing was not done  Gait and Station: not assessed due to telehealth format    Plan     - Weekly cognitive behavioral therapy for illness anxiety and other anxiety, OC symptoms.  - Discussed and agreed to the following goals for therapy:     Self-like    Tolerating of uncertainly    Able to go into the office    Expand range of activities    Reduce fear of infectous diseases and viruses  - RTC: weekly      Courtney Bunch, PhD. LP

## 2021-08-02 ENCOUNTER — VIRTUAL VISIT (OUTPATIENT)
Dept: PSYCHIATRY | Facility: CLINIC | Age: 26
End: 2021-08-02
Payer: COMMERCIAL

## 2021-08-02 DIAGNOSIS — F45.21 ILLNESS ANXIETY DISORDER: Primary | ICD-10-CM

## 2021-08-02 DIAGNOSIS — F40.10 SOCIAL ANXIETY DISORDER: ICD-10-CM

## 2021-08-02 DIAGNOSIS — F33.1 MODERATE RECURRENT MAJOR DEPRESSION (H): ICD-10-CM

## 2021-08-02 NOTE — PROGRESS NOTES
"Clinician Contact & Progress Note  Department of Psychiatry & Behavioral Sciences  [facility]      Date of Service: Aug 2, 2021  Duration of interview with patient: Start Time: 10.20a; Stop Time: 11:05a  Provider: Courtney Bunch, PhD,   Psychiatrist: None  PCP: Faina Hopkins  Other Providers: None  Referred by self     Identifying Data:  Gus Regalado is a 26 year old female who prefers the name of \"Clarisse\". The following information was obtained through a clinical interview, self-report questionnaires, and chart review. For more information, see Diagnostic Evaluation by Courtney Bunch, PhD on 12/3/2019.    Telehealth Video Visit AddOn  Type of service: Telemedicine Diagnostic Assessment for anxiety.  The patient's condition can be safely assessed and treated via synchronous audio and visual telemedicine encounter.    Mode of Communication:  Video Conference via [employer]  Reason for Telemedicine Visit: This patient visit was converted to a Telehealth video visit to minimize exposure to COVID-19.  Originating Site (Patient Location): Patient's home  Distant Site (Provider Location): Provider Remote Setting  Consent:  The patient has verbally consented to: the potential risks and benefits of telemedicine versus in person care with special emphasis on confidentiality given family members in the home.  Attestation: As the provider I attest to compliance with applicable laws and regulations related to telemedicine.    Encounter Diagnoses   Name Primary?     Illness anxiety disorder Yes     Moderate recurrent major depression (H)      Social anxiety disorder      Illness anxiety disorder: Fear of remigio infections, maria l. COVID-19, care-seeking type, with panic attacks     Assessment (current symptoms):  PHQ 2/10/2020 2/24/2020 3/9/2020   PHQ-9 Total Score 15 11 10   Q9: Thoughts of better off dead/self-harm past 2 weeks Several days Several days Several days     ANEL-7 SCORE 2/10/2020 2/24/2020 " 3/9/2020   Total Score 11 9 8     Today, patient reports stressed, anxious.    Updates since last visit:     Illness anxiety topic change: Anal itching triggering fear of pinworms.     Yesterday, met with 2 different providers (online), both re-assured her that it is extremely unlikely she has worms.    Treatment strategies:     Explored the above topics utilizing cognitive-behavioral, ERP and  acceptance-based techniques    obsessions related to pinworms; nightly checking rituals    Reviewed prev Homework and assigned new HW    New Homework:   Resist nightly checking, resist contacting Drs and requesting tests due to anxiety. Allow Dr's expertise to guide her behavior (they asked her to not call so often etc)    Prev Homework:     Further reduce Lysol wipes    Reduce handwashing after touching self (e.g., scratching ear) or objects in pt's appartment (currently washes approx. 30x without leaving apartmetn)    Keep hand-washing for 20 sec total after toilet, before eat    Eliminate mirror    Social Exposure Exercises:    [employer] ways - new people    Walking outside (social unrest)    Hierarchy of COVID- related Exposure Exercises (outside of apartment wears K95 + fabric mask + shield)    1. 4-6 - shower: no hand-washing while showering (made significant progress)  2. 4 - driving in the car by self without mask (drove to pharmacy, and curb-side pick-up. masked)  3. 5-6 - driving with cousin (rode with partner, no mask, no shield, was able to touch him after he got gas and used only hand )  4. 5-6 Walking outside (with people nearby)  5. 6 - curbside pickup (7 - if I have to talk to them and roll down window)  6. 6-7 Getting in the elevator by herself  7. 7- Picking up a package from   8. 7-8 - seeing/ walking close by a smoker outsides  9. 8 Sharing an elevator with triple masks  10. 8 - shopping at grocery store    Compulsions (no particular order):    Re-assurance seeking from nurse hotline,  sister    Getting tested    Shower handwashing rituals    Excessive hand-washing when outside of apartment    Washing / Applying Lysol to new items (food and otherwise)    Treatment Plan:     Complete between session activation assignments:    Continue CBT /ERP/ACT for Illness anxiety and depression    Pt was referred to residency clinic for medication management.     Due to high anxiety, pt requested second visit this week: Next appt 8/5 at 1p    MENTAL STATUS EXAM                                                                                       Alertness: alert  and oriented  Appearance: well groomed  Behavior/Demeanor: polite, quiet, cooperative and pleasant with good  eye contact   Speech: regular rate and rhythm, low volume  Language: intact  Psychomotor: normal or unremarkable  Mood: anxious, worried,   Affect: restricted; was congruent to mood; was congruent to content  Thought Process/Associations: unremarkable  Thought Content:  Reports worries;  Denies suicidal ideation and violent ideation  Perception: Denies auditory hallucinations and visual hallucinations  Insight: good  Judgment: good  Cognition: (6) does  appear grossly intact; formal cognitive testing was not done  Gait and Station: not assessed due to telehealth format    Plan     - Weekly cognitive behavioral therapy for illness anxiety and other anxiety, OC symptoms.  - Discussed and agreed to the following goals for therapy:     Self-like    Tolerating of uncertainly    Able to go into the office    Expand range of activities    Reduce fear of infectous diseases and viruses  - RTC: weekly      Courtney Bunch, PhD. LP

## 2021-08-05 ENCOUNTER — VIRTUAL VISIT (OUTPATIENT)
Dept: PSYCHIATRY | Facility: CLINIC | Age: 26
End: 2021-08-05
Payer: COMMERCIAL

## 2021-08-05 DIAGNOSIS — F45.21 ILLNESS ANXIETY DISORDER: Primary | ICD-10-CM

## 2021-08-05 DIAGNOSIS — F40.10 SOCIAL ANXIETY DISORDER: ICD-10-CM

## 2021-08-05 DIAGNOSIS — F33.1 MODERATE RECURRENT MAJOR DEPRESSION (H): ICD-10-CM
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
## 2021-08-23 ENCOUNTER — VIRTUAL VISIT (OUTPATIENT)
Dept: PSYCHIATRY | Facility: CLINIC | Age: 26
End: 2021-08-23
Payer: COMMERCIAL

## 2021-08-23 DIAGNOSIS — F33.1 MODERATE RECURRENT MAJOR DEPRESSION (H): ICD-10-CM

## 2021-08-23 DIAGNOSIS — F45.21 ILLNESS ANXIETY DISORDER: Primary | ICD-10-CM

## 2021-08-23 NOTE — PROGRESS NOTES
"Clinician Contact & Progress Note  Department of Psychiatry & Behavioral Sciences  [facility]      Date of Service: Aug 23, 2021  Duration of interview with patient: Start Time: 1:00p; Stop Time: 2:00p  Provider: Courtney Bunch, PhD,   Psychiatrist: None  PCP: Faina Hopkins  Other Providers: None  Referred by self     Identifying Data:  Gus Regalado is a 26 year old female who prefers the name of \"Clarisse\". The following information was obtained through a clinical interview, self-report questionnaires, and chart review. For more information, see Diagnostic Evaluation by Courtney Bunch, PhD on 12/3/2019.    Telehealth Video Visit AddOn  Type of service: Telemedicine Diagnostic Assessment for anxiety.  The patient's condition can be safely assessed and treated via synchronous audio and visual telemedicine encounter.    Mode of Communication:  Video Conference via [employer]   Reason for Telemedicine Visit: This patient visit was converted to a Telehealth video visit to minimize exposure to COVID-19.  Originating Site (Patient Location): Patient's home  Distant Site (Provider Location): Provider Remote Setting  Consent:  The patient has verbally consented to: the potential risks and benefits of telemedicine versus in person care with special emphasis on confidentiality given family members in the home.  Attestation: As the provider I attest to compliance with applicable laws and regulations related to telemedicine.    Encounter Diagnoses   Name Primary?     Illness anxiety disorder Yes     Moderate recurrent major depression (H)      Illness anxiety disorder: Fear of remigio infections, maria l. COVID-19, care-seeking type, with panic attacks     Assessment (current symptoms):  PHQ 2/10/2020 2/24/2020 3/9/2020   PHQ-9 Total Score 15 11 10   Q9: Thoughts of better off dead/self-harm past 2 weeks Several days Several days Several days     ANEL-7 SCORE 2/10/2020 2/24/2020 3/9/2020   Total Score 11 9 8 "     Today, patient reports having struggled to illness anxiety (now focused on bacterial vaginal infection)  Prior to that, was very anxious to point of not sleeping. Has hydroxyzine, but uses only for panic.    Updates since last visit:     Has been able to reduce hand-washing ritual to 20 seconds each times (except after bathroom), down to 20-30 (from 50-60); used to go a bottle of hand soap on 5-7days    Has been able to reduce mirror for prior reasons, but past few days has used for new focus    Since Nov 2020 when she had a her first yeast infection (fungal)    Worries about bacterial vaginosis (BV); getting it checked once or twice / month; sometimes have found sth (few cells of BV, which was tx with antibiotics, which     Decreased libido, worries about bf getting upset and leaving    Scheduled to see Dr Oliveira on 9/15    Repeated checking with mirror (different areas including vaginal); pic maxine is full f these pics)       Treatment strategies:     Explored the above topics utilizing cognitive-behavioral, ERP and  acceptance-based techniques:    Discussed resisting checking behavior, tolerating uncertainty.    HW: Resist checking (mirror, pictures, etc), resist contacting Drs and requesting tests due to anxiety. Resist google searches     Prev Homework:     Further reduce Lysol wipes    Reduce handwashing after touching self (e.g., scratching ear) or objects in pt's appartment (currently washes approx. 30x without leaving apartmetn)    Keep hand-washing for 20 sec total after toilet, before eat    Eliminate mirror    Social Exposure Exercises:    [employer] ways - new people    Walking outside (social unrest)    Hierarchy of COVID- related Exposure Exercises (outside of apartment wears K95 + fabric mask + shield)    1. 4-6 - shower: no hand-washing while showering (made significant progress)  2. 4 - driving in the car by self without mask (drove to pharmacy, and curb-side pick-up. masked)  3. 5-6 - driving with  cousin (gualberto with partner, no mask, no shield, was able to touch him after he got gas and used only hand )  4. 5-6 Walking outside (with people nearby)  5. 6 - curbside pickup (7 - if I have to talk to them and roll down window)  6. 6-7 Getting in the elevator by herself  7. 7- Picking up a package from   8. 7-8 - seeing/ walking close by a smoker outsides  9. 8 Sharing an elevator with triple masks  10. 8 - shopping at grocery store    Compulsions (no particular order):    Re-assurance seeking from nurse hotline, sister    Getting tested    Shower handwashing rituals    Excessive hand-washing when outside of apartment    Washing / Applying Lysol to new items (food and otherwise)    Treatment Plan:     Complete between session activation assignments:    Continue CBT /ERP/ACT for Illness anxiety and depression    Pt was referred to residency clinic for medication management.     Due to high anxiety, pt requested second visit this week: Next appt 9/13 at 10a    MENTAL STATUS EXAM                                                                                       Alertness: alert  and oriented  Appearance: well groomed  Behavior/Demeanor: polite, quiet, cooperative and pleasant with good  eye contact   Speech: regular rate and rhythm, low volume  Language: intact  Psychomotor: normal or unremarkable  Mood: anxious, worried,   Affect: restricted; was congruent to mood; was congruent to content  Thought Process/Associations: unremarkable  Thought Content:  Reports worries;  Denies suicidal ideation and violent ideation  Perception: Denies auditory hallucinations and visual hallucinations  Insight: good  Judgment: good  Cognition: (6) does  appear grossly intact; formal cognitive testing was not done  Gait and Station: not assessed due to telehealth format    Plan     - Weekly cognitive behavioral therapy for illness anxiety and other anxiety, OC symptoms.  - Discussed and agreed to the following  goals for therapy:     Self-like    Tolerating of uncertainly    Able to go into the office    Expand range of activities    Reduce fear of infectous diseases and viruses  - RTC: weekly      Courtney Bunch, PhD. LP
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
## 2021-09-13 ENCOUNTER — VIRTUAL VISIT (OUTPATIENT)
Dept: PSYCHIATRY | Facility: CLINIC | Age: 26
End: 2021-09-13
Payer: COMMERCIAL

## 2021-09-13 DIAGNOSIS — F45.21 ILLNESS ANXIETY DISORDER: Primary | ICD-10-CM

## 2021-09-13 DIAGNOSIS — F33.1 MODERATE RECURRENT MAJOR DEPRESSION (H): ICD-10-CM

## 2021-09-13 DIAGNOSIS — F40.10 SOCIAL ANXIETY DISORDER: ICD-10-CM

## 2021-09-13 NOTE — PROGRESS NOTES
"Clinician Contact & Progress Note  Department of Psychiatry & Behavioral Sciences  [facility]      Date of Service: Sep 13, 2021  Duration of interview with patient: Start Time: 10:00a; Stop Time: 11:00a  Provider: Courtney Bunch, PhD,   Psychiatrist: None  PCP: Faina Hopkins  Other Providers: None  Referred by self     Identifying Data:  Gus Regalado is a 26 year old female who prefers the name of \"Clarisse\". The following information was obtained through a clinical interview, self-report questionnaires, and chart review. For more information, see Diagnostic Evaluation by Courtney Bunch, PhD on 12/3/2019.    Telehealth Video Visit AddOn  Type of service: Telemedicine Diagnostic Assessment for anxiety.  The patient's condition can be safely assessed and treated via synchronous audio and visual telemedicine encounter.    Mode of Communication:  Video Conference via [employer]   Reason for Telemedicine Visit: This patient visit was converted to a Telehealth video visit to minimize exposure to COVID-19.  Originating Site (Patient Location): Patient's home  Distant Site (Provider Location): Provider Remote Setting  Consent:  The patient has verbally consented to: the potential risks and benefits of telemedicine versus in person care with special emphasis on confidentiality given family members in the home.  Attestation: As the provider I attest to compliance with applicable laws and regulations related to telemedicine.    Encounter Diagnoses   Name Primary?     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 COVID-19, care-seeking type, with panic attacks     Assessment (current symptoms):  PHQ 2/10/2020 2/24/2020 3/9/2020   PHQ-9 Total Score 15 11 10   Q9: Thoughts of better off dead/self-harm past 2 weeks Several days Several days Several days     ANEL-7 SCORE 2/10/2020 2/24/2020 " 3/9/2020   Total Score 11 9 8     Today, patient reports having struggled to illness anxiety (now focused on bed bugs). Anxious to point of not sleeping, eating. Has hydroxyzine, but uses only for panic.    Updates since last visit:     Has been able to resist contacting Drs and requesting tests due to anxiety.    Continues to engage excessively in google searches    Anxiety about vaginal infection lessoned; she speculates that is due to lack of triggers    New anxiety focus: bed bugs (trigger: red bump on back), slept on couch for 4-5 days due to fear of bugs    Sprayed chemicals, then got anxious about the chemicals, called 2 manufacturers about safety of sprays     Bought sleeves for bed, mattress protectors, new mattress last week    hand-washing ritual worsened     Treatment strategies:     Explored the above topics utilizing cognitive-behavioral, ERP and  acceptance-based techniques:    Discussed resisting washing behavior, tolerating uncertainty.    HW: Resist excessive handwashing (decrease from 4 x 20sec to 3-2- once x 20sec per wash; decrease frequency. Keep hand-washing to after coming into apartment from outside, using toilet, before eat, when soiled.    Prev Homework:     Further reduce Lysol wipes    Reduce handwashing after touching self (e.g., scratching ear) or objects in pt's appartment (currently washes approx. 30x without leaving apartment)      Social Exposure Exercises:    Jason ways - new people    Walking outside (social unrest)    Hierarchy of COVID- related Exposure Exercises (outside of apartment wears K95 + fabric mask + shield)    1. 4-6 - shower: no hand-washing while showering (made significant progress)  2. 4 - driving in the car by self without mask (drove to pharmacy, and curb-side pick-up. masked)  3. 5-6 - driving with cousin (rode with partner, no mask, no shield, was able to touch him after he got gas and used only hand )  4. 5-6 Walking outside (with people nearby)  5. 6  - curbside pickup (7 - if I have to talk to them and roll down window)  6. 6-7 Getting in the elevator by herself  7. 7- Picking up a package from   8. 7-8 - seeing/ walking close by a smoker outsides  9. 8 Sharing an elevator with triple masks  10. 8 - shopping at grocery store    Compulsions (no particular order):    Re-assurance seeking from nurse hotline, sister    Getting tested    Shower handwashing rituals    Excessive hand-washing when outside of apartment    Washing / Applying Lysol to new items (food and otherwise)    Treatment Plan:     Complete between session activation assignments:    Continue CBT /ERP/ACT for Illness anxiety and depression    Pt was referred to residency clinic for medication management.     Due to high anxiety, pt requested second visit this week: Next appt 9/13 at 10a    MENTAL STATUS EXAM                                                                                       Alertness: alert  and oriented  Appearance: well groomed  Behavior/Demeanor: polite, quiet, cooperative and pleasant with good  eye contact   Speech: regular rate and rhythm, low volume  Language: intact  Psychomotor: normal or unremarkable  Mood: anxious, worried,   Affect: restricted; was congruent to mood; was congruent to content  Thought Process/Associations: unremarkable  Thought Content:  Reports worries;  Denies suicidal ideation and violent ideation  Perception: Denies auditory hallucinations and visual hallucinations  Insight: good  Judgment: good  Cognition: (6) does  appear grossly intact; formal cognitive testing was not done  Gait and Station: not assessed due to telehealth format    Plan     - Weekly cognitive behavioral therapy for illness anxiety and other anxiety, OC symptoms.  - Discussed and agreed to the following goals for therapy:     Self-like    Tolerating of uncertainly    Able to go into the office    Expand range of activities    Reduce fear of infectous diseases and  viruses  - RTC: weekly      Courtney Bunch, PhD. LP

## 2021-09-15 ENCOUNTER — VIRTUAL VISIT (OUTPATIENT)
Dept: PSYCHIATRY | Facility: CLINIC | Age: 26
End: 2021-09-15
Attending: PSYCHIATRY & NEUROLOGY
Payer: COMMERCIAL

## 2021-09-15 ENCOUNTER — TELEPHONE (OUTPATIENT)
Dept: PSYCHIATRY | Facility: CLINIC | Age: 26
End: 2021-09-15

## 2021-09-15 DIAGNOSIS — F45.21 ILLNESS ANXIETY DISORDER: Primary | ICD-10-CM

## 2021-09-15 PROCEDURE — 90792 PSYCH DIAG EVAL W/MED SRVCS: CPT | Mod: GT | Performed by: STUDENT IN AN ORGANIZED HEALTH CARE EDUCATION/TRAINING PROGRAM

## 2021-09-15 RX ORDER — ESCITALOPRAM OXALATE 5 MG/1
TABLET ORAL
Qty: 60 TABLET | Refills: 1 | Status: SHIPPED | OUTPATIENT
Start: 2021-09-15

## 2021-09-15 NOTE — TELEPHONE ENCOUNTER
On September 15, 2021, at 2:27 PM, writer called patient at [phone] to confirm Virtual Visit. Writer unable to make contact with patient. Writer left detailed voice message for call back. [phone] left as call back number. Rafia Waters CMA    Writer called patient back at 2:48 PM at [place] to confirm virtual visit. Writer was still unable to reach patient. Link for [place] was sent via email to [email or web address]

## 2021-09-15 NOTE — PATIENT INSTRUCTIONS
Treatment Plan Today:     1) Medications-  Commence Escitalopram (lexapro) 5 mg daily for 7 days and then increase to 10 mg daily.     2) Follow-up appt with Dr Oliveira on 10/13/21    3) Crisis numbers are below and clinic after hours number is [phone]            **For crisis resources, please see the information at the end of this document**     Patient Education      Thank you for coming to the [facility] [place] [facility].    Lab Testing:  If you had lab testing today and your results are reassuring or normal they will be mailed to you or sent through [employer] within 7 days. If the lab tests need quick action we will call you with the results. The phone number we will call with results is # [phone] (home) . If this is not the best number please call our clinic and change the number.    Medication Refills:  If you need any refills please call your pharmacy and they will contact us. Our fax number for refills is [phone]. Please allow three business for refill processing. If you need to  your refill at a new pharmacy, please contact the new pharmacy directly. The new pharmacy will help you get your medications transferred.     Scheduling:  If you have any concerns about today's visit or wish to schedule another appointment please call our office during normal business hours [phone] (8-5:00 M-F)    Contact Us:  Please call [phone] during business hours (8-5:00 M-F).  If after clinic hours, or on the weekend, please call  [phone].    Financial Assistance [phone]  [facility] Billing [phone]  Central Billing Office, [facility]: [phone]  [facility] Billing [phone]  Medical Records [phone]  [facility] Patient Bill of Rights [email or web address]       MENTAL HEALTH CRISIS NUMBERS:  For a medical emergency please call  911 or go to the nearest ER.     [facility]:    [facility] -[phone]   Crisis Residence [facility] -[phone]   Walk-In Counseling Center [facility] -[phone]   COPE 24/7 [facility] Mobile Team -[phone] (adults)/[phone] (child)  CHILD: [facility] needs assessment team - [phone]      [facility] - [phone]   Walk-in counseling [facility] - [phone]   Walk-in counseling [facility] - [phone]   Crisis Residence [facility] - [phone]  Urgent Care Adult Mental Qsdbdg-[phone] mobile unit/ 24/7 crisis line    National Crisis Numbers:   National Suicide Prevention Lifeline: 0-803-533-TALK (966-355-8557)  Poison Control Center - 1-677.864.4280  Amen./resources for a list of additional resources (SOS)  Trans Lifeline a hotline for transgender people 1-719-226-1187  The [employer] a hotline for LGBT youth 1-684.429.9485  Crisis Text Line: For any crisis 24/7   To: 931082  see www.crisistextline.org  - IF MAKING A CALL FEELS TOO HARD, send a text!         Again thank you for choosing [facility] MENTAL HEALTH & ADDICTION [facility] and please let us know how we can best partner with you to improve you and your family's health.    You may be receiving a survey regarding this appointment. We would love to have your feedback, both positive and negative. The survey is done by an external company, so your answers are anonymous.

## 2021-09-20 ENCOUNTER — VIRTUAL VISIT (OUTPATIENT)
Dept: PSYCHIATRY | Facility: CLINIC | Age: 26
End: 2021-09-20
Payer: COMMERCIAL

## 2021-09-20 DIAGNOSIS — F40.10 SOCIAL ANXIETY DISORDER: ICD-10-CM

## 2021-09-20 DIAGNOSIS — F33.1 MODERATE RECURRENT MAJOR DEPRESSION (H): ICD-10-CM

## 2021-09-20 DIAGNOSIS — F45.21 ILLNESS ANXIETY DISORDER: Primary | ICD-10-CM

## 2021-09-20 ASSESSMENT — PATIENT HEALTH QUESTIONNAIRE - PHQ9: SUM OF ALL RESPONSES TO PHQ QUESTIONS 1-9: 15

## 2021-09-20 ASSESSMENT — ANXIETY QUESTIONNAIRES
5. BEING SO RESTLESS THAT IT IS HARD TO SIT STILL: SEVERAL DAYS
1. FEELING NERVOUS, ANXIOUS, OR ON EDGE: NEARLY EVERY DAY
4. TROUBLE RELAXING: MORE THAN HALF THE DAYS
GAD7 TOTAL SCORE: 15
IF YOU CHECKED OFF ANY PROBLEMS ON THIS QUESTIONNAIRE, HOW DIFFICULT HAVE THESE PROBLEMS MADE IT FOR YOU TO DO YOUR WORK, TAKE CARE OF THINGS AT HOME, OR GET ALONG WITH OTHER PEOPLE: EXTREMELY DIFFICULT
2. NOT BEING ABLE TO STOP OR CONTROL WORRYING: NEARLY EVERY DAY
3. WORRYING TOO MUCH ABOUT DIFFERENT THINGS: NEARLY EVERY DAY
7. FEELING AFRAID AS IF SOMETHING AWFUL MIGHT HAPPEN: MORE THAN HALF THE DAYS
6. BECOMING EASILY ANNOYED OR IRRITABLE: SEVERAL DAYS

## 2021-09-20 NOTE — PROGRESS NOTES
"Clinician Contact & Progress Note  Department of Psychiatry & Behavioral Sciences  [facility] [place]      Date of Service: Sep 20, 2021  Duration of interview with patient: Start Time: 1600; Stop Time: 1700  Provider: Courtney Bunch, PhD,   Psychiatrist: None  PCP: Faina Hopkins  Other Providers: None  Referred by self     Identifying Data:  Gus Regalado is a 26 year old female who prefers the name of \"Clarisse\". The following information was obtained through a clinical interview, self-report questionnaires, and chart review. For more information, see Diagnostic Evaluation by Courtney Bunch, PhD on 12/3/2019.    Telehealth Video Visit AddOn  Type of service: Telemedicine Diagnostic Assessment for anxiety.  The patient's condition can be safely assessed and treated via synchronous audio and visual telemedicine encounter.    Mode of Communication:  Video Conference via [employer]   Reason for Telemedicine Visit: This patient visit was converted to a Telehealth video visit to minimize exposure to COVID-19.  Originating Site (Patient Location): Patient's home  Distant Site (Provider Location): Provider Remote Setting  Consent:  The patient has verbally consented to: the potential risks and benefits of telemedicine versus in person care with special emphasis on confidentiality given family members in the home.  Attestation: As the provider I attest to compliance with applicable laws and regulations related to telemedicine.    Encounter Diagnoses   Name Primary?     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 COVID-19, care-seeking type, with panic attacks     Assessment (current symptoms):  PHQ 2/24/2020 3/9/2020 9/20/2021   PHQ-9 Total Score 11 10 15   Q9: Thoughts of better off dead/self-harm past 2 weeks Several days Several days Not at all     ANEL-7 SCORE 2/24/2020 3/9/2020 " "2021   Total Score 9 8 15     Today, patient reports having struggled to illness anxiety (now focused on bed bugs). Anxious to point of not sleeping, eating. Has hydroxyzine, but uses only for panic.    Updates since last visit:     Maternal grandmother is \"on her death bed\" (hospice).    Wants to visit [facility] before her final days, but illness anxiety keeps her from it.     Treatment strategies:     Explored the above topics utilizing cognitive-behavioral, ERP and  acceptance-based techniques:    [facility] hosp[facility], expected passing,  from [place] (honoring parents, loyalism, conforming) vs [place] (idivindualism, I can assert my preferences) culture perspective (e.g., touching, body contact)    Explored values and value-driven (vs fear or guilt-driven) actions    HW: visit MG with purpose and with reasonable safety precautions (mask, gloves)    Prev Homework:     Further reduce Lysol wipes    Reduce handwashing after touching self (e.g., scratching ear) or objects in pt's appartment (currently washes approx. 30x without leaving apartment)      Social Exposure Exercises:    Jason ways - new people    Walking outside (social unrest)    Hierarchy of COVID- related Exposure Exercises (outside of apartment wears K95 + fabric mask + shield)    1. 4-6 - shower: no hand-washing while showering (made significant progress)  2. 4 - driving in the car by self without mask (drove to pharmacy, and curb-side pick-up. masked)  3. 5-6 - driving with cousin (jarede with partner, no mask, no shield, was able to touch him after he got gas and used only hand )  4. 5-6 Walking outside (with people nearby)  5. 6 - curbside pickup (7 - if I have to talk to them and roll down window)  6. 6-7 Getting in the elevator by herself  7. 7- Picking up a package from   8. 7-8 - seeing/ walking close by a smoker outsides  9. 8 Sharing an elevator with triple masks  10. 8 - shopping at grocery store    Compulsions (no particular " order):    Re-assurance seeking from nurse hotline, sister    Getting tested    Shower handwashing rituals    Excessive hand-washing when outside of apartment    Washing / Applying Lysol to new items (food and otherwise)    Treatment Plan:     Complete between session activation assignments:    Continue CBT /ERP/ACT for Illness anxiety and depression    Pt was referred to residency clinic for medication management.     Due to high anxiety, pt requested second visit this week: Next appt 9/13 at 10a    MENTAL STATUS EXAM                                                                                       Alertness: alert  and oriented  Appearance: well groomed  Behavior/Demeanor: polite, quiet, cooperative and pleasant with good  eye contact   Speech: regular rate and rhythm, low volume  Language: intact  Psychomotor: normal or unremarkable  Mood: anxious, worried,   Affect: restricted; was congruent to mood; was congruent to content  Thought Process/Associations: unremarkable  Thought Content:  Reports worries;  Denies suicidal ideation and violent ideation  Perception: Denies auditory hallucinations and visual hallucinations  Insight: good  Judgment: good  Cognition: (6) does  appear grossly intact; formal cognitive testing was not done  Gait and Station: not assessed due to telehealth format    Plan     - Weekly cognitive behavioral therapy for illness anxiety and other anxiety, OC symptoms.  - Discussed and agreed to the following goals for therapy:     Self-like    Tolerating of uncertainly    Able to go into the office    Expand range of activities    Reduce fear of infectous diseases and viruses  - RTC: weekly      Courtney Bunch, PhD. LP

## 2021-09-21 ASSESSMENT — ANXIETY QUESTIONNAIRES: GAD7 TOTAL SCORE: 15

## 2021-09-28 ENCOUNTER — VIRTUAL VISIT (OUTPATIENT)
Dept: PSYCHIATRY | Facility: CLINIC | Age: 26
End: 2021-09-28
Payer: COMMERCIAL

## 2021-09-28 DIAGNOSIS — F45.21 ILLNESS ANXIETY DISORDER: Primary | ICD-10-CM

## 2021-09-28 DIAGNOSIS — F40.10 SOCIAL ANXIETY DISORDER: ICD-10-CM

## 2021-10-05 ENCOUNTER — VIRTUAL VISIT (OUTPATIENT)
Dept: PSYCHIATRY | Facility: CLINIC | Age: 26
End: 2021-10-05
Payer: COMMERCIAL

## 2021-10-05 DIAGNOSIS — F40.10 SOCIAL ANXIETY DISORDER: ICD-10-CM

## 2021-10-05 DIAGNOSIS — F45.21 ILLNESS ANXIETY DISORDER: Primary | ICD-10-CM

## 2021-10-05 DIAGNOSIS — F33.1 MODERATE RECURRENT MAJOR DEPRESSION (H): ICD-10-CM

## 2021-10-05 ASSESSMENT — ANXIETY QUESTIONNAIRES
1. FEELING NERVOUS, ANXIOUS, OR ON EDGE: NEARLY EVERY DAY
2. NOT BEING ABLE TO STOP OR CONTROL WORRYING: MORE THAN HALF THE DAYS
GAD7 TOTAL SCORE: 12
4. TROUBLE RELAXING: SEVERAL DAYS
6. BECOMING EASILY ANNOYED OR IRRITABLE: SEVERAL DAYS
3. WORRYING TOO MUCH ABOUT DIFFERENT THINGS: NEARLY EVERY DAY
5. BEING SO RESTLESS THAT IT IS HARD TO SIT STILL: NOT AT ALL
IF YOU CHECKED OFF ANY PROBLEMS ON THIS QUESTIONNAIRE, HOW DIFFICULT HAVE THESE PROBLEMS MADE IT FOR YOU TO DO YOUR WORK, TAKE CARE OF THINGS AT HOME, OR GET ALONG WITH OTHER PEOPLE: VERY DIFFICULT
7. FEELING AFRAID AS IF SOMETHING AWFUL MIGHT HAPPEN: MORE THAN HALF THE DAYS

## 2021-10-05 ASSESSMENT — PATIENT HEALTH QUESTIONNAIRE - PHQ9: SUM OF ALL RESPONSES TO PHQ QUESTIONS 1-9: 16

## 2021-10-05 NOTE — PROGRESS NOTES
"Clinician Contact & Progress Note  Department of Psychiatry & Behavioral Sciences  [facility]      Date of Service: Oct 5, 2021  Duration of interview with patient: Start Time: 0900; Stop Time: 1000  Provider: Courtney Bunch, PhD,   Psychiatrist: None  PCP: Faina Hopkins  Other Providers: None  Referred by self     Identifying Data:  Gus Regalado is a 26 year old female who prefers the name of \"Clarisse\". The following information was obtained through a clinical interview, self-report questionnaires, and chart review. For more information, see Diagnostic Evaluation by Courtney Bunch, PhD on 12/3/2019.    Telehealth Video Visit AddOn  Type of service: Telemedicine Diagnostic Assessment for anxiety.  The patient's condition can be safely assessed and treated via synchronous audio and visual telemedicine encounter.    Mode of Communication:  Video Conference via [employer]   Reason for Telemedicine Visit: This patient visit was converted to a Telehealth video visit to minimize exposure to COVID-19.  Originating Site (Patient Location): Patient's home  Distant Site (Provider Location): Provider Remote Setting  Consent:  The patient has verbally consented to: the potential risks and benefits of telemedicine versus in person care with special emphasis on confidentiality given family members in the home.  Attestation: As the provider I attest to compliance with applicable laws and regulations related to telemedicine.    Encounter Diagnoses   Name Primary?     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 COVID-19, care-seeking type, with panic attacks     Assessment (current symptoms):  PHQ 3/9/2020 9/20/2021 10/5/2021   PHQ-9 Total Score 10 15 16   Q9: Thoughts of better off dead/self-harm past 2 weeks Several days Not at all Not at all     ANEL-7 SCORE 3/9/2020 9/20/2021 10/5/2021 "   Total Score 8 15 12     Today, patient reports slight improvement with anxiety (more able to relax and stop worrying). Anxiety interferes with going to sleep. Has hydroxyzine, but uses only for panic, not for sleep as it makes her drowsy. Benadryl also causes drowsiness.     Hasn't tried Melatonin, asked pt to ask Dr Oliveira about it.    Possible selective serotonin reuptake inhibitor SE? Reports itchiness, tired all the time, fatigue     Updates since last visit:     Maternal grandmother's  last weekend    OCD (illness focus) remains severe.     Treatment strategies:     Explored the above topics utilizing cognitive-behavioral, ERP and  acceptance-based techniques:    Homework:     Further reduce washing, cleaning compulsion, focus on healthy and regular food intake as discussed.    Social Exposure Exercises:    Jason ways - new people    Walking outside (social unrest)    Hierarchy of COVID- related Exposure Exercises (outside of apartment wears K95 + fabric mask + shield)    1. 4-6 - shower: no hand-washing while showering (made significant progress)  2. 4 - driving in the car by self without mask (drove to pharmacy, and curb-side pick-up. masked)  3. 5-6 - driving with cousin (rode with partner, no mask, no shield, was able to touch him after he got gas and used only hand )  4. 5-6 Walking outside (with people nearby)  5. 6 - curbside pickup (7 - if I have to talk to them and roll down window)  6. 6-7 Getting in the elevator by herself  7. 7- Picking up a package from   8. 7-8 - seeing/ walking close by a smoker outsides  9. 8 Sharing an elevator with triple masks  10. 8 - shopping at grocery store    Compulsions (no particular order):    Re-assurance seeking from nurse hotline, sister    Getting tested    Shower handwashing rituals    Excessive hand-washing when outside of apartment    Washing / Applying Lysol to new items (food and otherwise)    Treatment Plan:     Complete between  session activation assignments:    Continue CBT /ERP/ACT for Illness anxiety and depression    Pt was referred to residency clinic for medication management.     Due to high anxiety, pt requested second visit this week: Next appt 9/13 at 10a    MENTAL STATUS EXAM                                                                                       Alertness: alert  and oriented  Appearance: well groomed  Behavior/Demeanor: polite, quiet, cooperative and pleasant with good  eye contact   Speech: regular rate and rhythm, low volume  Language: intact  Psychomotor: normal or unremarkable  Mood: anxious, worried,   Affect: restricted; was congruent to mood; was congruent to content  Thought Process/Associations: unremarkable  Thought Content:  Reports worries;  Denies suicidal ideation and violent ideation  Perception: Denies auditory hallucinations and visual hallucinations  Insight: good  Judgment: good  Cognition: (6) does  appear grossly intact; formal cognitive testing was not done  Gait and Station: not assessed due to telehealth format    Plan     - Weekly cognitive behavioral therapy for illness anxiety and other anxiety, OC symptoms.  - Discussed and agreed to the following goals for therapy:     Self-like    Tolerating of uncertainly    Able to go into the office    Expand range of activities    Reduce fear of infectous diseases and viruses  - RTC: weekly      Courtney Bunch, PhD. LP

## 2021-10-06 ASSESSMENT — ANXIETY QUESTIONNAIRES: GAD7 TOTAL SCORE: 12
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
## 2021-10-11 ENCOUNTER — TELEPHONE (OUTPATIENT)
Dept: PSYCHIATRY | Facility: CLINIC | Age: 26
End: 2021-10-11

## 2021-10-11 ENCOUNTER — VIRTUAL VISIT (OUTPATIENT)
Dept: PSYCHIATRY | Facility: CLINIC | Age: 26
End: 2021-10-11
Payer: COMMERCIAL

## 2021-10-11 DIAGNOSIS — F40.10 SOCIAL ANXIETY DISORDER: ICD-10-CM

## 2021-10-11 DIAGNOSIS — F45.21 ILLNESS ANXIETY DISORDER: Primary | ICD-10-CM

## 2021-10-11 DIAGNOSIS — F33.1 MODERATE RECURRENT MAJOR DEPRESSION (H): ICD-10-CM

## 2021-10-11 NOTE — CONFIDENTIAL NOTE
Called patient as her reports itchiness all over her body, which started 1-2 days after starting Lexapro  Patient mann not have a rash. No mucous membrane involvement.  Wakes her up from sleep sometimes, has tried moisturizers and scabies txt with no relief  Has noticed some improvement in mood and anxiety with the Lexapro but not OCD symptoms.  I informed patient that usually would require higher dose of Lexapro for OCD  than that which is needed for anxiety or depression.    No safety concerns- does not endorse SI.    Discussed to hold off on Lexapro for now, get appointment with PCP to review cause of itchiness.  We will see her in the clinic on Wednesday 10/13/21.  I have also given her the number for [facility] treatment for OCD to call - [phone]

## 2021-10-11 NOTE — PROGRESS NOTES
"Clinician Contact & Progress Note  Department of Psychiatry & Behavioral Sciences  [facility]      Date of Service: Oct 11, 2021  Duration of interview with patient: Start Time: 1400; Stop Time: 1500  Provider: Courtney Bunch, PhD,   Psychiatrist: None  PCP: Faina Hopkins  Other Providers: None  Referred by self     Identifying Data:  Gus Regalado is a 26 year old female who prefers the name of \"Clarisse\". The following information was obtained through a clinical interview, self-report questionnaires, and chart review. For more information, see Diagnostic Evaluation by Courtney Bunch, PhD on 12/3/2019.    Telehealth Video Visit AddOn  Type of service: Telemedicine Diagnostic Assessment for anxiety.  The patient's condition can be safely assessed and treated via synchronous audio and visual telemedicine encounter.    Mode of Communication:  Video Conference via [employer]   Reason for Telemedicine Visit: This patient visit was converted to a Telehealth video visit to minimize exposure to COVID-19.  Originating Site (Patient Location): Patient's home  Distant Site (Provider Location): Provider Remote Setting  Consent:  The patient has verbally consented to: the potential risks and benefits of telemedicine versus in person care with special emphasis on confidentiality given family members in the home.  Attestation: As the provider I attest to compliance with applicable laws and regulations related to telemedicine.    Encounter Diagnoses   Name Primary?     Illness anxiety disorder Yes     Social anxiety disorder      Moderate recurrent major depression (H)      Illness anxiety disorder: Fear of reimgio infections, maria l. COVID-19, care-seeking type, with panic attacks     Assessment (current symptoms):  PHQ 3/9/2020 9/20/2021 10/5/2021   PHQ-9 Total Score 10 15 16   Q9: Thoughts of better off dead/self-harm past 2 weeks Several days Not at all Not at all     ANEL-7 SCORE 3/9/2020 9/20/2021 " "10/5/2021   Total Score 8 15 12     Today, patient reports no change and high anxiety. Anxiety interferes with going to sleep. Has hydroxyzine, but uses only for panic, not for sleep as it makes her drowsy. Benadryl    Updates since last visit:     \"bubble is getting smaller\"    Increase in fear of remigio COVID-19 and contagious skin conditions    Increased anxiety due to itching:    She reports itchiness all over her body, which started 1-2 days after starting Lexapro. We're not sure if there is a relation to the med, but wanted to let you know as the itching is having serious consequences. She has scratched herself to the point of bleeding, scabbing and bruising. She bought various ointments and bandages. got scabies cream for the itching, applied all-over-body    has eczema on both hands, which started this summer (so preceding Lexapro).    Discussed consulting with her PCP and/or dermatology.    She has been loosing weight due to not eating (due to fear of contamination). Down to 103 lbs (from 126 lbs) at 5'2.5 (BMI around 18.5). avoids food hat requires washing (ie. Fresh fruit and veggies).Has moved into new apartment 2 months ago and hasn't used knives yet, doesn't use water glasses.     Treatment strategies:   Explored the above topics utilizing cognitive-behavioral, ERP and  acceptance-based techniques.  - referred to consult with her psychiatrist re potential Lexapro SE, PCP and/or dermatology.     HW: reduce avoidance behavior regarding food intake (sit at dining room table, use utensils). Prioritize healthy foods and increase caloric intake.    Prev Homework:   Further cleaning, washing compulsions: Reduce handwashing as discussed    Social Exposure Exercises:    Jason ways - new people    Walking outside (social unrest)    Hierarchy of COVID- related Exposure Exercises (outside of apartment wears K95 + fabric mask + shield)    1. 4-6 - shower: no hand-washing while showering (made significant " progress)  2. 4 - driving in the car by self without mask (drove to pharmacy, and curb-side pick-up. masked)  3. 5-6 - driving with cousin (gualberto with partner, no mask, no shield, was able to touch him after he got gas and used only hand )  4. 5-6 Walking outside (with people nearby)  5. 6 - curbside pickup (7 - if I have to talk to them and roll down window)  6. 6-7 Getting in the elevator by herself  7. 7- Picking up a package from   8. 7-8 - seeing/ walking close by a smoker outsides  9. 8 Sharing an elevator with triple masks  10. 8 - shopping at grocery store    Compulsions (no particular order):    Re-assurance seeking from nurse hotline, sister    Getting tested    Shower handwashing rituals    Excessive hand-washing when outside of apartment    Washing / Applying Lysol to new items (food and otherwise)    Treatment Plan:     Complete between session activation assignments:    Continue CBT /ERP/ACT for Illness anxiety and depression    Pt was referred to residency clinic for medication management.     Due to high anxiety, pt requested second visit this week: Next appt 9/13 at 10a    MENTAL STATUS EXAM                                                                                       Alertness: alert  and oriented  Appearance: well groomed  Behavior/Demeanor: polite, quiet, cooperative and pleasant with good  eye contact   Speech: regular rate and rhythm, low volume  Language: intact  Psychomotor: normal or unremarkable  Mood: anxious, worried,   Affect: restricted; was congruent to mood; was congruent to content  Thought Process/Associations: unremarkable  Thought Content:  Reports worries;  Denies suicidal ideation and violent ideation  Perception: Denies auditory hallucinations and visual hallucinations  Insight: good  Judgment: good  Cognition: (6) does  appear grossly intact; formal cognitive testing was not done  Gait and Station: not assessed due to telehealth format    Plan     -  Weekly cognitive behavioral therapy for illness anxiety and other anxiety, OC symptoms.  - Discussed and agreed to the following goals for therapy:     Self-like    Tolerating of uncertainly    Able to go into the office    Expand range of activities    Reduce fear of infectous diseases and viruses  - RTC: weekly      Courtney Bunch, PhD. LP

## 2021-10-13 ENCOUNTER — VIRTUAL VISIT (OUTPATIENT)
Dept: PSYCHIATRY | Facility: CLINIC | Age: 26
End: 2021-10-13
Attending: PSYCHIATRY & NEUROLOGY
Payer: COMMERCIAL

## 2021-10-13 DIAGNOSIS — F45.21 ILLNESS ANXIETY DISORDER: Primary | ICD-10-CM

## 2021-10-13 PROCEDURE — 99215 OFFICE O/P EST HI 40 MIN: CPT | Mod: GT | Performed by: STUDENT IN AN ORGANIZED HEALTH CARE EDUCATION/TRAINING PROGRAM

## 2021-10-13 RX ORDER — FLUOXETINE 10 MG/1
CAPSULE ORAL
Qty: 60 CAPSULE | Refills: 0 | Status: SHIPPED | OUTPATIENT
Start: 2021-10-13 | End: 2021-12-10

## 2021-10-13 ASSESSMENT — PAIN SCALES - GENERAL: PAINLEVEL: NO PAIN (0)

## 2021-10-13 NOTE — PROGRESS NOTES
"VIDEO VISIT  Gus Regalado is a 26 year old patient that has consented to receive services via billable video visit.      The patient has been notified of following:   \"This video visit will be conducted via a call between you and your physician/provider. We have found that certain health care needs can be provided without the need for an in-person physical exam. This service lets us provide the care you need with a video conversation. If a prescription is necessary we can send it directly to your pharmacy. If lab work is needed we can place an order for that and you can then stop by our lab to have the test done at a later time. Insurers are generally covering virtual visits as they would in-office visits so billing should not be different than normal.  If for some reason you do get billed incorrectly, you should contact the billing office to correct it and that number is in the AVS .    Video Conference to be completed via:  Epi    If patient attempts to join the video via [employer] but is unable to, they would prefer that the provider send them a video invitation via:   Send to e-mail at: [email or web address]      How would patient like to obtain AVS?:  [employer]   Video invitation was sent to [email or web address] per patient/guardian request.  "

## 2021-10-13 NOTE — PATIENT INSTRUCTIONS
Treatment Plan Today:     1) Medications- - Commence Fluoxetine 10 mg daily for 7 days and then increase to 20 mg      2) Follow-up appt with Dr Oliveira 11/4/21 @ 2:45 PM    3) Crisis numbers are below and clinic after hours number is [phone]            **For crisis resources, please see the information at the end of this document**     Patient Education      Thank you for coming to the [facility] [place] [facility].    Lab Testing:  If you had lab testing today and your results are reassuring or normal they will be mailed to you or sent through [employer] within 7 days. If the lab tests need quick action we will call you with the results. The phone number we will call with results is # [phone] (home) . If this is not the best number please call our clinic and change the number.    Medication Refills:  If you need any refills please call your pharmacy and they will contact us. Our fax number for refills is [phone]. Please allow three business for refill processing. If you need to  your refill at a new pharmacy, please contact the new pharmacy directly. The new pharmacy will help you get your medications transferred.     Scheduling:  If you have any concerns about today's visit or wish to schedule another appointment please call our office during normal business hours [phone] (8-5:00 M-F)    Contact Us:  Please call [phone] during business hours (8-5:00 M-F).  If after clinic hours, or on the weekend, please call  [phone].    Financial Assistance [phone]  [facility] Billing [phone]  Central Billing Office, [facility]: [phone]  [facility] Billing [phone]  Medical Records [phone]  [facility] Patient Bill of Rights [email or web address]       MENTAL HEALTH CRISIS NUMBERS:  For a medical emergency please call  911 or go to the nearest ER.     [facility]:   [facility]  [facility] -[phone]   Crisis Residence [facility] -[phone]   Walk-In Counseling Center [facility] -[phone]   COPE 24/7 [facility] Mobile Team -[phone] (adults)/[phone] (child)  CHILD: [facility] needs assessment team - [phone]      [facility]:   [facility] - [phone]   Walk-in counseling [facility] - [phone]   Walk-in counseling [facility] - [phone]   Crisis Residence [facility] - [phone]  Urgent Care Adult Mental [facility]-[phone] mobile unit/ 24/7 crisis line    National Crisis Numbers:   National Suicide Prevention Lifeline: 5-751-299-TALK (181-793-3203)  Poison Control Center - 1-134.746.9710  [employer]/resources for a list of additional resources (SOS)  Trans Lifeline a hotline for transgender people 1-513.347.7497  [employer] a hotline for LGBT youth 1-991.185.2939  Crisis Text Line: For any crisis 24/7   To: 252511  see www.crisistextline.org  - IF MAKING A CALL FEELS TOO HARD, send a text!         Again thank you for choosing [facility] MENTAL HEALTH & ADDICTION [facility] and please let us know how we can best partner with you to improve you and your family's health.    You may be receiving a survey regarding this appointment. We would love to have your feedback, both positive and negative. The survey is done by an external company, so your answers are anonymous.

## 2021-10-15 ENCOUNTER — TELEPHONE (OUTPATIENT)
Dept: PSYCHIATRY | Facility: CLINIC | Age: 26
End: 2021-10-15

## 2021-10-15 NOTE — TELEPHONE ENCOUNTER
"Writer received incoming call from patient with complains of chest tightness after taking mornings dose of Fluoxetine. Patient shared she started to take Fluoxetine 10 mg yesterday. Shared she took it with food yesterday and felt nauseous. Today she took it on an empty stomach and shared started to have pressure in the chest 20 min after taking the medications. Patient denies any radiating pain or difficulty breathing. She is able to walk around and denies of any dizziness or other side effects. She is complaining of \"warm air in the chest and pressure.\" Also mentioned the chest tightness is similar to when she experiences a panic attack. Rates pain 4/10 at this time. Patient is unsure how to proceed.     Educated patient to come in the ED if symptoms worsen.     Writer agreed to reach out to provider.     "

## 2021-10-15 NOTE — CONFIDENTIAL NOTE
Saw message about chest pain after starting Fluoxetine was about 4/10, no SOB. Called to speak with patient, left message will call back patient before the end of the day.

## 2021-10-15 NOTE — TELEPHONE ENCOUNTER
Courtney Oliveira MD  You 9 minutes ago (2:35 PM)     MO    Thanks, I've spoken with her. I advised to hold off on the medication for now.    Message text

## 2021-10-15 NOTE — TELEPHONE ENCOUNTER
Writer reached out patient to obtain an update. Patient shared her chest tightness has improved after eating a granola bar and laying down. Reports pain 2/10 at this time. Denies any dizziness, radiating pain, SOB or other side effects. She will continue to relax until recommendations from provider. Patient is also willing to try taking Fluoxetine 10 mg with meal to avoid this type of a reaction.     Routed to provider

## 2021-10-15 NOTE — CONFIDENTIAL NOTE
Called patient, reports noticing chest tightness and fulness after taking Prozac, some hyperventilation, felt anxious. Did not have chest pain, sweating or SOB.    At this time feeling better, tightness has resolved.  No safety issues.    A: Likely ADR    P:     - Advised to discontinue Prozac  - MTM referral placed  - Patient to call clinic or Emergency services if concern for chest pain or SOB  - Would consider alternative medications after MTM review  - Continue CBT

## 2021-10-20 ENCOUNTER — TELEPHONE (OUTPATIENT)
Dept: PSYCHIATRY | Facility: CLINIC | Age: 26
End: 2021-10-20

## 2021-10-20 NOTE — CONFIDENTIAL NOTE
Got message from [facility]: [facility] referral outreach attempt #2 on October 20, 2021 at 4:18 PM      Outcome: Patient not reachable after several attempts, will route to [facility] Pharmacist/Provider as an FYI.  [facility] scheduling number is [phone].  Thank you for the referral.  Called patient to inform her that they attempted to reach her, she will call back the scheduling number in the morning.    She does not have suicidal thoughts

## 2021-10-20 NOTE — TELEPHONE ENCOUNTER
MTM referral from: [facility] visit (referral by provider)    MTM referral outreach attempt #2 on October 20, 2021 at 4:18 PM      Outcome: Patient not reachable after several attempts, will route to MTM Pharmacist/Provider as an FYI.  MT scheduling number is [phone].  Thank you for the referral.    Nain Valladares, MTM coordinator

## 2021-10-21 NOTE — PROGRESS NOTES
"Clinician Contact & Progress Note  Department of Psychiatry & Behavioral Sciences  [facility]      Date of Service: Sep 28, 2021  Duration of interview with patient: Start Time: 1600; Stop Time: 1700  Provider: Courtney Bunch, PhD,   Psychiatrist: None  PCP: Faina Hopkins  Other Providers: None  Referred by self     Identifying Data:  Gus Regalado is a 26 year old female who prefers the name of \"Clarisse\". The following information was obtained through a clinical interview, self-report questionnaires, and chart review. For more information, see Diagnostic Evaluation by Courtney Bunch, PhD on 12/3/2019.    Telehealth Video Visit AddOn  Type of service: Telemedicine Diagnostic Assessment for anxiety.  The patient's condition can be safely assessed and treated via synchronous audio and visual telemedicine encounter.    Mode of Communication:  Video Conference via [employer]   Reason for Telemedicine Visit: This patient visit was converted to a Telehealth video visit to minimize exposure to COVID-19.  Originating Site (Patient Location): Patient's home  Distant Site (Provider Location): Provider Remote Setting  Consent:  The patient has verbally consented to: the potential risks and benefits of telemedicine versus in person care with special emphasis on confidentiality given family members in the home.  Attestation: As the provider I attest to compliance with applicable laws and regulations related to telemedicine.    Encounter Diagnoses   Name Primary?     Illness anxiety disorder Yes     Social anxiety disorder      Illness anxiety disorder: Fear of remigio infections, maria l. COVID-19, care-seeking type, with panic attacks     Assessment (current symptoms):  PHQ 3/9/2020 9/20/2021 10/5/2021   PHQ-9 Total Score 10 15 16   Q9: Thoughts of better off dead/self-harm past 2 weeks Several days Not at all Not at all     ANEL-7 SCORE 3/9/2020 9/20/2021 10/5/2021   Total Score 8 15 12     Today, patient " reports having struggled to illness anxiety .    Updates since last visit:   No change in OCD sxs and anxiety     Treatment strategies:     Utilized cognitive-behavioral, ERP and  acceptance-based techniques      Prev Homework:     Further reduce Lysol wipes    Reduce handwashing after touching self (e.g., scratching ear) or objects in pt's appartment (currently washes approx. 30x without leaving apartment)      Social Exposure Exercises:    Jason ways - new people    Walking outside (social unrest)    Hierarchy of COVID- related Exposure Exercises (outside of apartment wears K95 + fabric mask + shield)    1. 4-6 - shower: no hand-washing while showering (made significant progress)  2. 4 - driving in the car by self without mask (drove to pharmacy, and curb-side pick-up. masked)  3. 5-6 - driving with cousin (rode with partner, no mask, no shield, was able to touch him after he got gas and used only hand )  4. 5-6 Walking outside (with people nearby)  5. 6 - curbside pickup (7 - if I have to talk to them and roll down window)  6. 6-7 Getting in the elevator by herself  7. 7- Picking up a package from   8. 7-8 - seeing/ walking close by a smoker outsides  9. 8 Sharing an elevator with triple masks  10. 8 - shopping at grocery store    Compulsions (no particular order):    Re-assurance seeking from nurse hotline, sister    Getting tested    Shower handwashing rituals    Excessive hand-washing when outside of apartment    Washing / Applying Lysol to new items (food and otherwise)    Treatment Plan:     Complete between session activation assignments:    Continue CBT /ERP/ACT for Illness anxiety and depression    Pt was referred to residency clinic for medication management.     Due to high anxiety, pt requested second visit this week: Next appt 9/13 at 10a    MENTAL STATUS EXAM                                                                                       Alertness: alert  and  oriented  Appearance: well groomed  Behavior/Demeanor: polite, quiet, cooperative and pleasant with good  eye contact   Speech: regular rate and rhythm, low volume  Language: intact  Psychomotor: normal or unremarkable  Mood: anxious, worried,   Affect: restricted; was congruent to mood; was congruent to content  Thought Process/Associations: unremarkable  Thought Content:  Reports worries;  Denies suicidal ideation and violent ideation  Perception: Denies auditory hallucinations and visual hallucinations  Insight: good  Judgment: good  Cognition: (6) does  appear grossly intact; formal cognitive testing was not done  Gait and Station: not assessed due to telehealth format    Plan     - Weekly cognitive behavioral therapy for illness anxiety and other anxiety, OC symptoms.  - Discussed and agreed to the following goals for therapy:     Self-like    Tolerating of uncertainly    Able to go into the office    Expand range of activities    Reduce fear of infectous diseases and viruses  - RTC: weekly      Courtney Bunch, PhD. LP

## 2021-10-24 ENCOUNTER — HEALTH MAINTENANCE LETTER (OUTPATIENT)
Age: 26
End: 2021-10-24
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
## 2021-11-04 ENCOUNTER — VIRTUAL VISIT (OUTPATIENT)
Dept: PSYCHIATRY | Facility: CLINIC | Age: 26
End: 2021-11-04
Attending: PSYCHIATRY & NEUROLOGY
Payer: COMMERCIAL

## 2021-11-04 DIAGNOSIS — F42.2 MIXED OBSESSIONAL THOUGHTS AND ACTS: Primary | ICD-10-CM

## 2021-11-04 PROCEDURE — 99214 OFFICE O/P EST MOD 30 MIN: CPT | Mod: GT | Performed by: STUDENT IN AN ORGANIZED HEALTH CARE EDUCATION/TRAINING PROGRAM

## 2021-11-04 ASSESSMENT — PAIN SCALES - GENERAL: PAINLEVEL: NO PAIN (0)

## 2021-11-04 NOTE — PROGRESS NOTES
"VIDEO VISIT  Gus Regalado is a 26 year old patient that has consented to receive services via billable video visit.      The patient has been notified of following:   \"This video visit will be conducted via a call between you and your physician/provider. We have found that certain health care needs can be provided without the need for an in-person physical exam. This service lets us provide the care you need with a video conversation. If a prescription is necessary we can send it directly to your pharmacy. If lab work is needed we can place an order for that and you can then stop by our lab to have the test done at a later time. Insurers are generally covering virtual visits as they would in-office visits so billing should not be different than normal.  If for some reason you do get billed incorrectly, you should contact the billing office to correct it and that number is in the AVS .    Patient will join video visit via:  Elect[employer] Database (Patient / guardian confirmed to join via Electron Database)    If patient attempts to join the video via Electron Database at appointment start time, but is unable to, they would prefer that the provider send them a video invitation via:   Send to preferred e-mail: [email or web address]      How would patient like to obtain AVS?:  MyChart    "

## 2021-11-04 NOTE — PATIENT INSTRUCTIONS
It was nice seeing you today    Treatment Plan Today:     1) Medications-  - Commence Fluoxetine 10 mg daily for 7 days and then increase to 20 mg    2) Follow-up appt with Dr Oliveira 12/2/21 @ 2:45 PM    3) Crisis numbers are below and clinic after hours number is [phone]            **For crisis resources, please see the information at the end of this document**     Patient Education      Thank you for coming to the [facility] [place] [facility].    Lab Testing:  If you had lab testing today and your results are reassuring or normal they will be mailed to you or sent through [employer] within 7 days. If the lab tests need quick action we will call you with the results. The phone number we will call with results is # [phone] (home) . If this is not the best number please call our clinic and change the number.    Medication Refills:  If you need any refills please call your pharmacy and they will contact us. Our fax number for refills is [phone]. Please allow three business for refill processing. If you need to  your refill at a new pharmacy, please contact the new pharmacy directly. The new pharmacy will help you get your medications transferred.     Scheduling:  If you have any concerns about today's visit or wish to schedule another appointment please call our office during normal business hours [phone] (8-5:00 M-F)    Contact Us:  Please call [phone] during business hours (8-5:00 M-F).  If after clinic hours, or on the weekend, please call  [phone].    Financial Assistance [phone]  [facility] Billing [phone]  Central Billing Office, [facility]: [phone]  [place] Billing [phone]  Medical Records [phone]  [place] Patient Bill of Rights [email or web address]       MENTAL HEALTH CRISIS NUMBERS:  For a medical emergency please call  911 or go to the nearest ER.      [facility]:   [facility] -[phone]   Crisis Residence [facility] -[phone]   Walk-In Counseling Center [facility] -[phone]   COPE 24/7 [place] Mobile Team -[phone] (adults)/[phone] (child)  CHILD: [facility] needs assessment team - [phone]      [facility]:   [facility] - [phone]   Walk-in counseling [facility] - [phone]   Walk-in counseling [facility] - [phone]   Crisis Residence [facility] - [phone]  Urgent Care Adult Mental [facility]-[phone] mobile unit/ 24/7 crisis line    National Crisis Numbers:   National Suicide Prevention Lifeline: 0-043-209-TALK (573-260-2228)  Poison Control Center - 1-910.236.7943  [employer]/resources for a list of additional resources (SOS)  Trans Lifeline a hotline for transgender people 5-200-852-8939  The [employer] a hotline for LGBT youth 1-554.589.3634  Crisis Text Line: For any crisis 24/7   To: 825889  see www.crisistextline.org  - IF MAKING A CALL FEELS TOO HARD, send a text!         Again thank you for choosing [facility] M[facility]DI[facility] and please let us know how we can best partner with you to improve you and your family's health.    You may be receiving a survey regarding this appointment. We would love to have your feedback, both positive and negative. The survey is done by an external company, so your answers are anonymous.

## 2021-11-08 ENCOUNTER — VIRTUAL VISIT (OUTPATIENT)
Dept: PSYCHIATRY | Facility: CLINIC | Age: 26
End: 2021-11-08
Payer: COMMERCIAL

## 2021-11-08 DIAGNOSIS — F45.21 ILLNESS ANXIETY DISORDER: Primary | ICD-10-CM

## 2021-11-08 NOTE — PROGRESS NOTES
"Clinician Contact & Progress Note  Department of Psychiatry & Behavioral Sciences  [facility]      Date of Service: Nov 8, 2021  Duration of interview with patient: Start Time: 1400; Stop Time: 1500  Provider: Courtney Bunch, PhD,   Psychiatrist: None  PCP: Faina Hopkins  Other Providers: None  Referred by self     Identifying Data:  Gus Regalado is a 26 year old female who prefers the name of \"Clarisse\". The following information was obtained through a clinical interview, self-report questionnaires, and chart review. For more information, see Diagnostic Evaluation by Courtney Bunch, PhD on 12/3/2019.    Telehealth Video Visit AddOn  Type of service: Telemedicine Diagnostic Assessment for anxiety.  The patient's condition can be safely assessed and treated via synchronous audio and visual telemedicine encounter.    Mode of Communication:  Video Conference via [employer]   Reason for Telemedicine Visit: This patient visit was converted to a Telehealth video visit to minimize exposure to COVID-19.  Originating Site (Patient Location): Patient's home  Distant Site (Provider Location): Provider Remote Setting  Consent:  The patient has verbally consented to: the potential risks and benefits of telemedicine versus in person care with special emphasis on confidentiality given family members in the home.  Attestation: As the provider I attest to compliance with applicable laws and regulations related to telemedicine.    Encounter Diagnosis   Name Primary?     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 COVID-19, care-seeking type, with panic attacks     Assessment (current symptoms):  PHQ 3/9/2020 9/20/2021 10/5/2021   PHQ-9 Total Score 10 15 16   Q9: Thoughts of better off dead/self-harm past 2 weeks Several days Not at all Not at all     ANEL-7 SCORE 3/9/2020 9/20/2021 10/5/2021   Total Score 8 15 12       Today, reports high anxiety. Spends 3 " "hours/day with O's and 2 hours/day with C's, \"Can't eat, think, work\"    Compulsive checking of bodily sensations    Updates since last visit:     Improved OCD with regard to COVID and avoidance behavior (able to eat indoors, ride elevator with others, less hand-washing.    Has made improvements on nearly all items on the hierarchy (below)    Was camping with partner Friday night to  pm, which was a \"big test\" and she \"made it through it\" with mostly just hand  to wash her hands and much reduced ritualizing.    Checking: calls different pharmacists frequently (e.g., 4 different pharmacists and  before drinking herbal tea; telehealth appointments 2-3 x / day + urgent care visits.    Washin-5 laundries / week, avoids cooking, can't wear clothes that have touched the floor. Only plastic utensils and paper plates. 1 box of laundry detergent in 3-4 weeks, 1 bottle of hand soap / week.    Eating is improving: is \"trying to eat 2 meals / day\": gained 3lbs (now 106/7lbs);     Treatment strategies:     Explored the above behaviors and avoidance utilizing cognitive-behavioral, ERP and  acceptance-based techniques.    Discussed HW (below)    Discussed tx options. PCP listed [facility] as an option intensive program. Pt expressed fear of \"hospitalization\". Had negative experience with hospitalization due to SI via [place]. Processed experience in order to reduce fear of intensive programs.    Discussed how her body focus and hypervigilance may increase likelihood of bodily sensations, anxiety and \"SE\".    HW: reduce hand washing to 20sec per time. Continue with reducing avoidance behavior regarding food intake (sit at dining room table, use utensils). Prioritize healthy foods and increase caloric intake.    Hierarchy of COVID- related Exposure Exercises (outside of apartment wears K95 + fabric mask + shield)    1. 4-6 - shower: no hand-washing while showering (made significant " progress)  2. 4 - driving in the car by self without mask (drove to pharmacy, and curb-side pick-up. masked)  3. 5-6 - driving with cousin (gualberto with partner, no mask, no shield, was able to touch him after he got gas and used only hand )  4. 5-6 Walking outside (with people nearby)  5. 6 - curbside pickup (7 - if I have to talk to them and roll down window)  6. 6-7 Getting in the elevator by herself  7. 7- Picking up a package from   8. 7-8 - seeing/ walking close by a smoker outsides  9. 8 Sharing an elevator with triple masks  10. 8 - shopping at grocery store    Compulsions (no particular order):    Re-assurance seeking from nurse hotline, sister    Getting tested    Shower handwashing rituals    Excessive hand-washing when outside of apartment    Washing / Applying Lysol to new items (food and otherwise)    Treatment Plan:     Complete between session activation assignments:    Continue CBT /ERP/ACT for Illness anxiety and depression    Continue with psych medication management.       MENTAL STATUS EXAM                                                                                       Alertness: alert  and oriented  Appearance: well groomed  Behavior/Demeanor: polite, quiet, cooperative and pleasant with good  eye contact   Speech: regular rate and rhythm, low volume  Language: intact  Psychomotor: normal or unremarkable  Mood: anxious, euthymic  Affect: restricted; was congruent to mood; was congruent to content  Thought Process/Associations: unremarkable  Thought Content:  Reports worries;  Denies suicidal ideation and violent ideation  Perception: Denies auditory hallucinations and visual hallucinations  Insight: good  Judgment: good  Cognition: (6) does  appear grossly intact; formal cognitive testing was not done  Gait and Station: not assessed due to telehealth format    Courtney Bunch, PhD. LP

## 2021-11-22 ENCOUNTER — VIRTUAL VISIT (OUTPATIENT)
Dept: PSYCHIATRY | Facility: CLINIC | Age: 26
End: 2021-11-22
Payer: COMMERCIAL

## 2021-11-22 DIAGNOSIS — F45.21 ILLNESS ANXIETY DISORDER: Primary | ICD-10-CM

## 2021-11-22 DIAGNOSIS — F40.10 SOCIAL ANXIETY DISORDER: ICD-10-CM

## 2021-11-22 DIAGNOSIS — F42.2 MIXED OBSESSIONAL THOUGHTS AND ACTS: ICD-10-CM

## 2021-11-22 NOTE — PROGRESS NOTES
"Clinician Contact & Progress Note  Department of Psychiatry & Behavioral Sciences  [facility]      Date of Service: Nov 22, 2021  Duration of interview with patient: Start Time: 1400; Stop Time: 1500  Provider: Courtney Bunch, PhD,   Psychiatrist: None  PCP: Faina Hopkins  Other Providers: None  Referred by self     Identifying Data:  Gus Regalado is a 26 year old female who prefers the name of \"Clarisse\". The following information was obtained through a clinical interview, self-report questionnaires, and chart review. For more information, see Diagnostic Evaluation by Courtney Bunch, PhD on 12/3/2019.    Telehealth Video Visit AddOn  Type of service: Telemedicine Diagnostic Assessment for anxiety.  The patient's condition can be safely assessed and treated via synchronous audio and visual telemedicine encounter.    Mode of Communication:  Video Conference via EyeSpot   Reason for Telemedicine Visit: This patient visit was converted to a Telehealth video visit to minimize exposure to COVID-19.  Originating Site (Patient Location): Patient's home  Distant Site (Provider Location): Provider Remote Setting  Consent:  The patient has verbally consented to: the potential risks and benefits of telemedicine versus in person care with special emphasis on confidentiality given family members in the home.  Attestation: As the provider I attest to compliance with applicable laws and regulations related to telemedicine.    Encounter Diagnoses   Name Primary?     Illness anxiety disorder Yes     Mixed obsessional thoughts and acts      Social anxiety disorder      Illness anxiety disorder: Fear of remigio infections, maria l. COVID-19, care-seeking type, with panic attacks     Assessment (current symptoms):  PHQ 3/9/2020 9/20/2021 10/5/2021   PHQ-9 Total Score 10 15 16   Q9: Thoughts of better off dead/self-harm past 2 weeks Several days Not at all Not at all     ANEL-7 SCORE 3/9/2020 9/20/2021 10/5/2021 "   Total Score 8 15 12     Today, reports high anxiety when about to go to Dr, about to check. Meds apparently beneficial in reducing the duration of anxiety periods and residual anxiety between spikes    Updates since last visit:     Improved OCD with regard to mirror checking in anal area and elsewhere. No longer keeps mirror at the bed. Less mirror checking during the day triggered by small sensations.    Was camping again with partner, which helps reduce ritualizing.    Treatment strategies:     Explored the above behaviors /avoidance utilizing cognitive-behavioral, ERP and  acceptance-based techniques.    Discussed repeat homework    Repeat HW: reduce hand washing to 20sec per time. Continue with reducing avoidance behavior regarding food intake (sit at dining room table, use utensils). Prioritize healthy foods and increase caloric intake.    Hierarchy of COVID- related Exposure Exercises (outside of apartment wears K95 + fabric mask + shield)    1. 4-6 - shower: no hand-washing while showering (made significant progress)  2. 4 - driving in the car by self without mask (drove to pharmacy, and curb-side pick-up. masked)  3. 5-6 - driving with cousin (rode with partner, no mask, no shield, was able to touch him after he got gas and used only hand )  4. 5-6 Walking outside (with people nearby)  5. 6 - curbside pickup (7 - if I have to talk to them and roll down window)  6. 6-7 Getting in the elevator by herself  7. 7- Picking up a package from   8. 7-8 - seeing/ walking close by a smoker outsides  9. 8 Sharing an elevator with triple masks  10. 8 - shopping at grocery store    Compulsions (no particular order):    Re-assurance seeking from nurse hotline, sister    Getting tested    Shower handwashing rituals    Excessive hand-washing when outside of apartment    Washing / Applying Lysol to new items (food and otherwise)    Treatment Plan:     Complete between session activation  assignments:    Continue CBT /ERP/ACT for Illness anxiety and depression    Continue with psych medication management.       MENTAL STATUS EXAM                                                                                       Alertness: alert  and oriented  Appearance: well groomed  Behavior/Demeanor: polite, quiet, cooperative and pleasant with good  eye contact   Speech: regular rate and rhythm, low volume  Language: intact  Psychomotor: normal or unremarkable  Mood: anxious, euthymic  Affect: restricted; was congruent to mood; was congruent to content  Thought Process/Associations: unremarkable  Thought Content:  Reports worries;  Denies suicidal ideation and violent ideation  Perception: Denies auditory hallucinations and visual hallucinations  Insight: good  Judgment: good  Cognition: (6) does  appear grossly intact; formal cognitive testing was not done  Gait and Station: not assessed due to telehealth format    Courtney Bunch, PhD. LP

## 2021-12-06 ENCOUNTER — VIRTUAL VISIT (OUTPATIENT)
Dept: PSYCHIATRY | Facility: CLINIC | Age: 26
End: 2021-12-06
Payer: COMMERCIAL

## 2021-12-06 DIAGNOSIS — F42.2 MIXED OBSESSIONAL THOUGHTS AND ACTS: ICD-10-CM

## 2021-12-06 DIAGNOSIS — F40.10 SOCIAL ANXIETY DISORDER: ICD-10-CM

## 2021-12-06 DIAGNOSIS — F45.21 ILLNESS ANXIETY DISORDER: Primary | ICD-10-CM

## 2021-12-06 NOTE — PROGRESS NOTES
"Clinician Contact & Progress Note  Department of Psychiatry & Behavioral Sciences  [facility]      Date of Service: Dec 6, 2021  Duration of interview with patient: Start Time: 1400; Stop Time: 1500  Provider: Courtney Bunch, PhD,   Psychiatrist: None  PCP: Faina Hopkins  Other Providers: None  Referred by self     Identifying Data:  Gus Regalado is a 26 year old female who prefers the name of \"Clarisse\". The following information was obtained through a clinical interview, self-report questionnaires, and chart review. For more information, see Diagnostic Evaluation by Courtney Bunch, PhD on 12/3/2019.    Telehealth Video Visit AddOn  Type of service: Telemedicine Diagnostic Assessment for anxiety.  The patient's condition can be safely assessed and treated via synchronous audio and visual telemedicine encounter.    Mode of Communication:  Video Conference via H2scan   Reason for Telemedicine Visit: This patient visit was converted to a Telehealth video visit to minimize exposure to COVID-19.  Originating Site (Patient Location): Patient's home  Distant Site (Provider Location): Provider Remote Setting  Consent:  The patient has verbally consented to: the potential risks and benefits of telemedicine versus in person care with special emphasis on confidentiality given family members in the home.  Attestation: As the provider I attest to compliance with applicable laws and regulations related to telemedicine.    Encounter Diagnoses   Name Primary?     Illness anxiety disorder Yes     Mixed obsessional thoughts and acts      Social anxiety disorder      Illness anxiety disorder: Fear of remigio infections, maria l. COVID-19, care-seeking type, with panic attacks     Assessment (current symptoms):  PHQ 3/9/2020 9/20/2021 10/5/2021   PHQ-9 Total Score 10 15 16   Q9: Thoughts of better off dead/self-harm past 2 weeks Several days Not at all Not at all     ANEL-7 SCORE 3/9/2020 9/20/2021 10/5/2021 "   Total Score 8 15 12     Today, reports some mild benefit from med in anxiety/ability to resist checking, washing    Updates since last visit:     Improvements in reducing # of handwashing in shower    Rectal bleeding for past 2 weeks (suspects Prozac)    Able to go to grocery store, wearing just 1 K95    Improved OCD with regard to mirror checking in anal area, but now check for anal bleeding    Treatment strategies:     Explored the above behaviors /avoidance utilizing cognitive-behavioral, ERP and  acceptance-based techniques.    Discussed self-worth, lovability schemas    Discussed repeat homework    New HW: eliminate handwashing in shower  Repeat HW: further reduce hand washing to 20sec per time. Continue with reducing avoidance behavior regarding food intake (sit at dining room table, use utensils). Prioritize healthy foods and increase caloric intake.    Hierarchy of COVID- related Exposure Exercises (outside of apartment wears K95 + fabric mask + shield)    1. 4-6 - shower: no hand-washing while showering (made significant progress)  2. 4 - driving in the car by self without mask (drove to pharmacy, and curb-side pick-up. masked)  3. 5-6 - driving with cousin (gualberto with partner, no mask, no shield, was able to touch him after he got gas and used only hand )  4. 5-6 Walking outside (with people nearby)  5. 6 - curbside pickup (7 - if I have to talk to them and roll down window)  6. 6-7 Getting in the elevator by herself  7. 7- Picking up a package from   8. 7-8 - seeing/ walking close by a smoker outsides  9. 8 Sharing an elevator with triple masks  10. 8 - shopping at grocery store    Compulsions (no particular order):    Re-assurance seeking from nurse hotline, sister    Getting tested    Shower handwashing rituals    Excessive hand-washing when outside of apartment    Washing / Applying Lysol to new items (food and otherwise)    Treatment Plan:     Complete between session activation  assignments:    Continue CBT /ERP/ACT for Illness anxiety and depression    Continue with psych medication management.       MENTAL STATUS EXAM                                                                                       Alertness: alert  and oriented  Appearance: well groomed  Behavior/Demeanor: polite, quiet, cooperative and pleasant with good  eye contact   Speech: regular rate and rhythm, low volume  Language: intact  Psychomotor: normal or unremarkable  Mood: anxious, euthymic  Affect: restricted; was congruent to mood; was congruent to content  Thought Process/Associations: unremarkable  Thought Content:  Reports worries;  Denies suicidal ideation and violent ideation  Perception: Denies auditory hallucinations and visual hallucinations  Insight: good  Judgment: good  Cognition: (6) does  appear grossly intact; formal cognitive testing was not done  Gait and Station: not assessed due to telehealth format    Courtney Bunch, PhD. LP

## 2021-12-10 DIAGNOSIS — F45.21 ILLNESS ANXIETY DISORDER: Primary | ICD-10-CM

## 2021-12-10 NOTE — TELEPHONE ENCOUNTER
[facility] Call Center    Phone Message    May a detailed message be left on voicemail: yes     Reason for Call: Medication Refill Request    Has the patient contacted the pharmacy for the refill? Yes   Name of medication being requested: fluoxetine   Provider who prescribed the medication: Roxanne  Pharmacy: [id number] - [place], [place] - [address] AT [address] [place] [address]    Date medication is needed: asap, pt only has enough medication left until tomorrow      Action Taken: Message routed to:  Other: nursing pool    Travel Screening: Not Applicable

## 2021-12-10 NOTE — TELEPHONE ENCOUNTER
Last seen: 11/4  RTC: 3 weeks, 4 weeks, 12/2  Non-provider cancel: 12/2 Other (Need to reschedule)  No-show: None  Next appt: 12/16     Incoming refill from patient via telephone     Medication requested:  Disp Refills Start End FORTUNATO    FLUoxetine (PROZAC) 10 MG capsule 60 capsule 0 10/13/2021  --   Sig: Take 1 capsule (10mg) by mouth daily for 7 days and then increase to 2 capsules (20 mg) daily     From 11/4/21 virtual visit note:   - Commence Fluoxetine 10 mg daily for 7 days and then increase to 20 mg     Writer called pt, who confirmed that she has not been experiencing any of the symptoms that she experienced when she started the medication previously. Pt reported that she had experienced some rectal bleeding, but was informed by her PCP that she could continue with the fluoxetine. Writer prompted pt that refill will be fore 20 mg capsules, so she will take only one per day. Pt identified understanding.    Refill routed to provider due to refill protocol (cancellation).          12/10/21 1150 Courtney Martinez MD   E-Prescribing Status: Receipt confirmed by pharmacy (12/10/2021 11:51 AM CST)

## 2021-12-16 ENCOUNTER — VIRTUAL VISIT (OUTPATIENT)
Dept: PSYCHIATRY | Facility: CLINIC | Age: 26
End: 2021-12-16
Attending: PSYCHIATRY & NEUROLOGY
Payer: COMMERCIAL

## 2021-12-16 DIAGNOSIS — F42.2 MIXED OBSESSIONAL THOUGHTS AND ACTS: Primary | ICD-10-CM

## 2021-12-16 PROCEDURE — 99214 OFFICE O/P EST MOD 30 MIN: CPT | Mod: GT | Performed by: STUDENT IN AN ORGANIZED HEALTH CARE EDUCATION/TRAINING PROGRAM

## 2021-12-16 RX ORDER — FLUOXETINE 40 MG/1
40 CAPSULE ORAL DAILY
Qty: 30 CAPSULE | Refills: 1 | Status: SHIPPED | OUTPATIENT
Start: 2021-12-16 | End: 2022-02-09

## 2021-12-16 ASSESSMENT — ANXIETY QUESTIONNAIRES
GAD7 TOTAL SCORE: 16
3. WORRYING TOO MUCH ABOUT DIFFERENT THINGS: NEARLY EVERY DAY
7. FEELING AFRAID AS IF SOMETHING AWFUL MIGHT HAPPEN: NEARLY EVERY DAY
GAD7 TOTAL SCORE: 16
7. FEELING AFRAID AS IF SOMETHING AWFUL MIGHT HAPPEN: NEARLY EVERY DAY
2. NOT BEING ABLE TO STOP OR CONTROL WORRYING: NEARLY EVERY DAY
1. FEELING NERVOUS, ANXIOUS, OR ON EDGE: NEARLY EVERY DAY
5. BEING SO RESTLESS THAT IT IS HARD TO SIT STILL: SEVERAL DAYS
GAD7 TOTAL SCORE: 16
4. TROUBLE RELAXING: MORE THAN HALF THE DAYS
6. BECOMING EASILY ANNOYED OR IRRITABLE: SEVERAL DAYS

## 2021-12-16 ASSESSMENT — PAIN SCALES - GENERAL: PAINLEVEL: NO PAIN (0)

## 2021-12-16 ASSESSMENT — PATIENT HEALTH QUESTIONNAIRE - PHQ9
10. IF YOU CHECKED OFF ANY PROBLEMS, HOW DIFFICULT HAVE THESE PROBLEMS MADE IT FOR YOU TO DO YOUR WORK, TAKE CARE OF THINGS AT HOME, OR GET ALONG WITH OTHER PEOPLE: SOMEWHAT DIFFICULT
SUM OF ALL RESPONSES TO PHQ QUESTIONS 1-9: 11
SUM OF ALL RESPONSES TO PHQ QUESTIONS 1-9: 11
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
## 2021-12-16 NOTE — PATIENT INSTRUCTIONS
**For crisis resources, please see the information at the end of this document**   Patient Education    Thank you for coming to the [facility] [place] [facility].    Lab Testing:  If you had lab testing today and your results are reassuring or normal they will be mailed to you or sent through [employer] within 7 days. If the lab tests need quick action we will call you with the results. The phone number we will call with results is # [phone] (home) . If this is not the best number please call our clinic and change the number.    Medication Refills:  If you need any refills please call your pharmacy and they will contact us. Our fax number for refills is [phone]. Please allow three business for refill processing. If you need to  your refill at a new pharmacy, please contact the new pharmacy directly. The new pharmacy will help you get your medications transferred.     Scheduling:  If you have any concerns about today's visit or wish to schedule another appointment please call our office during normal business hours [phone] (8-5:00 M-F)    Contact Us:  Please call [phone] during business hours (8-5:00 M-F).  If after clinic hours, or on the weekend, please call  [phone].    Financial Assistance [phone]  [facility] Billing [phone]  Central Billing Office, [facility]: [phone]  [facility] Billing [phone]  Medical Records [phone]  [facility] Patient Bill of Rights [email or web address]       MENTAL HEALTH CRISIS NUMBERS:  For a medical emergency please call  911 or go to the nearest ER.     [facility]:   [facility] -[phone]   Crisis Residence [facility] -[phone]   Walk-In Counseling Center [facility] -[phone]   COPE 24/7 [place] Mobile Team -[phone] (adults)/[phone] (child)  CHILD: [facility] needs assessment team -  [phone]      [facility]:   [facility] - [phone]   Walk-in counseling [facility] - [phone]   Walk-in counseling [facility] - [phone]   Crisis Residence [facility] - [phone]  [facility]-[phone] mobile unit/ 24/7 crisis line    National Crisis Numbers:   National Suicide Prevention Lifeline: 0-482-157-TALK (192-674-1284)  Poison Control Center - 2-871-870-2746  [employer]/resources for a list of additional resources (SOS)  Trans Lifeline a hotline for transgender people 0-103-107-7186  [employer] a hotline for LGBT youth 0-311-268-3632  Crisis Text Line: For any crisis 24/7   To: 048801  see www.crisistextline.org  - IF MAKING A CALL FEELS TOO HARD, send a text!         Again thank you for choosing [facility] M[facility] & [facility] and please let us know how we can best partner with you to improve you and your family's health.    You may be receiving a survey regarding this appointment. We would love to have your feedback, both positive and negative. The survey is done by an external company, so your answers are anonymous.

## 2021-12-17 ASSESSMENT — ANXIETY QUESTIONNAIRES: GAD7 TOTAL SCORE: 16

## 2021-12-17 ASSESSMENT — PATIENT HEALTH QUESTIONNAIRE - PHQ9: SUM OF ALL RESPONSES TO PHQ QUESTIONS 1-9: 11

## 2022-01-03 ENCOUNTER — VIRTUAL VISIT (OUTPATIENT)
Dept: PSYCHIATRY | Facility: CLINIC | Age: 27
End: 2022-01-03
Payer: COMMERCIAL

## 2022-01-03 DIAGNOSIS — F40.10 SOCIAL ANXIETY DISORDER: ICD-10-CM

## 2022-01-03 DIAGNOSIS — F45.21 ILLNESS ANXIETY DISORDER: Primary | ICD-10-CM

## 2022-01-03 ASSESSMENT — ANXIETY QUESTIONNAIRES
GAD7 TOTAL SCORE: 14
1. FEELING NERVOUS, ANXIOUS, OR ON EDGE: NEARLY EVERY DAY
3. WORRYING TOO MUCH ABOUT DIFFERENT THINGS: NEARLY EVERY DAY
IF YOU CHECKED OFF ANY PROBLEMS ON THIS QUESTIONNAIRE, HOW DIFFICULT HAVE THESE PROBLEMS MADE IT FOR YOU TO DO YOUR WORK, TAKE CARE OF THINGS AT HOME, OR GET ALONG WITH OTHER PEOPLE: VERY DIFFICULT
4. TROUBLE RELAXING: MORE THAN HALF THE DAYS
7. FEELING AFRAID AS IF SOMETHING AWFUL MIGHT HAPPEN: NEARLY EVERY DAY
2. NOT BEING ABLE TO STOP OR CONTROL WORRYING: NEARLY EVERY DAY
6. BECOMING EASILY ANNOYED OR IRRITABLE: NOT AT ALL
5. BEING SO RESTLESS THAT IT IS HARD TO SIT STILL: NOT AT ALL

## 2022-01-03 ASSESSMENT — PATIENT HEALTH QUESTIONNAIRE - PHQ9: SUM OF ALL RESPONSES TO PHQ QUESTIONS 1-9: 12

## 2022-01-03 NOTE — PROGRESS NOTES
"Clinician Contact & Progress Note  Department of Psychiatry & Behavioral Sciences  [facility]      Date of Service: Tobin 3, 2022  Duration of interview with patient: Start Time: 1400; Stop Time: 1500  Provider: Courtney Bunch, PhD,   Psychiatrist: None  PCP: Faina Hopkins  Other Providers: None  Referred by self     Identifying Data:  Gus Regalado is a 26 year old female who prefers the name of \"Clarisse\". The following information was obtained through a clinical interview, self-report questionnaires, and chart review. For more information, see Diagnostic Evaluation by Courtney Bunch, PhD on 12/3/2019.    Telehealth Video Visit AddOn  Type of service: Telemedicine Diagnostic Assessment for anxiety.  The patient's condition can be safely assessed and treated via synchronous audio and visual telemedicine encounter.    Mode of Communication:  Video Conference via [employer]   Reason for Telemedicine Visit: This patient visit was converted to a Telehealth video visit to minimize exposure to COVID-19.  Originating Site (Patient Location): Patient's home  Distant Site (Provider Location): Provider Remote Setting  Consent:  The patient has verbally consented to: the potential risks and benefits of telemedicine versus in person care with special emphasis on confidentiality given family members in the home.  Attestation: As the provider I attest to compliance with applicable laws and regulations related to telemedicine.    Encounter Diagnoses   Name Primary?     Illness anxiety disorder Yes     Social anxiety disorder      Illness anxiety disorder: Fear of remigio infections, maria l. COVID-19, care-seeking type, with panic attacks     Assessment (current symptoms):  PHQ 10/5/2021 12/16/2021 1/3/2022   PHQ-9 Total Score 16 11 12   Q9: Thoughts of better off dead/self-harm past 2 weeks Not at all Not at all Not at all     ANEL-7 SCORE 10/5/2021 12/16/2021 1/3/2022   Total Score - 16 (severe anxiety) - " "  Total Score 12 16 14     Today, reports doing \"fairly well\"; no restlessness, irritability.  Reports some improvement in checking / washing behavior.    Updates since last visit:     Was Able to attend gatherings, able to eat at the same floor as family    Improvements in reducing # of handwashing, in part due to excema (she's trying to protect skin: Only handwashing 3x while in shower (before 10-20x per shower)    Treatment strategies:     Explored the above behaviors /avoidance utilizing cognitive-behavioral, ERP and  acceptance-based techniques.    Discussed guilt, shame, fear of conflict and abandonment in context of her (Thai) upbringing, family dynamics and current values.     Discussed homework    HW: continue working on the following goals:    eliminate handwashing in shower    further reduce hand washing to 20sec per time.     Continue with reducing avoidance behavior regarding food intake (sit at dining room table, use utensils).     Prioritize healthy foods and increase caloric intake.    Hierarchy of COVID- related Exposure Exercises (outside of apartment wears K95 + fabric mask + shield)    1. 4-6 - shower: no hand-washing while showering (made significant progress)  2. 4 - driving in the car by self without mask (drove to pharmacy, and curb-side pick-up. masked)  3. 5-6 - driving with cousin (gualberto with partner, no mask, no shield, was able to touch him after he got gas and used only hand )  4. 5-6 Walking outside (with people nearby)  5. 6 - curbside pickup (7 - if I have to talk to them and roll down window)  6. 6-7 Getting in the elevator by herself  7. 7- Picking up a package from   8. 7-8 - seeing/ walking close by a smoker outsides  9. 8 Sharing an elevator with triple masks  10. 8 - shopping at grocery store    Compulsions (no particular order):    Re-assurance seeking from nurse hotline, sister    Getting tested    Shower handwashing rituals    Excessive hand-washing " when outside of apartment    Washing / Applying Lysol to new items (food and otherwise)    Treatment Plan:     Complete between session activation assignments:    Continue CBT /ERP/ACT for Illness anxiety and depression    Continue with psych medication management.       MENTAL STATUS EXAM                                                                                       Alertness: alert  and oriented  Appearance: well groomed  Behavior/Demeanor: polite, quiet, cooperative and pleasant with good  eye contact   Speech: regular rate and rhythm, low volume  Language: intact  Psychomotor: normal or unremarkable  Mood: anxious, euthymic  Affect: restricted; was congruent to mood; was congruent to content  Thought Process/Associations: unremarkable  Thought Content:  Reports worries;  Denies suicidal ideation and violent ideation  Perception: Denies auditory hallucinations and visual hallucinations  Insight: good  Judgment: good  Cognition: (6) does  appear grossly intact; formal cognitive testing was not done  Gait and Station: not assessed due to telehealth format    Courtney Bunch, PhD.

## 2022-01-04 ASSESSMENT — ANXIETY QUESTIONNAIRES: GAD7 TOTAL SCORE: 14

## 2022-01-17 ENCOUNTER — VIRTUAL VISIT (OUTPATIENT)
Dept: PSYCHIATRY | Facility: CLINIC | Age: 27
End: 2022-01-17
Payer: COMMERCIAL

## 2022-01-17 DIAGNOSIS — F33.1 MODERATE RECURRENT MAJOR DEPRESSION (H): ICD-10-CM

## 2022-01-17 DIAGNOSIS — F40.10 SOCIAL ANXIETY DISORDER: Primary | ICD-10-CM

## 2022-01-17 DIAGNOSIS — F45.21 ILLNESS ANXIETY DISORDER: ICD-10-CM

## 2022-01-17 ASSESSMENT — PATIENT HEALTH QUESTIONNAIRE - PHQ9: SUM OF ALL RESPONSES TO PHQ QUESTIONS 1-9: 13

## 2022-01-17 NOTE — PROGRESS NOTES
"Clinician Contact & Progress Note  Department of Psychiatry & Behavioral Sciences  [facility]      Date of Service: Jan 17, 2022  Duration of interview with patient: Start Time: 1400; Stop Time: 1500  Provider: Courtney Bunch, PhD,   Psychiatrist: None  PCP: Faina Hopkins  Other Providers: None  Referred by self     Identifying Data:  Gus Regalado is a 26 year old female who prefers the name of \"Clarisse\". The following information was obtained through a clinical interview, self-report questionnaires, and chart review. For more information, see Diagnostic Evaluation by Courtney Bunch, PhD on 12/3/2019.    Telehealth Video Visit AddOn  Type of service: Telemedicine Diagnostic Assessment for anxiety.  The patient's condition can be safely assessed and treated via synchronous audio and visual telemedicine encounter.    Mode of Communication:  Video Conference via [employer]   Reason for Telemedicine Visit: This patient visit was converted to a Telehealth video visit to minimize exposure to COVID-19.  Originating Site (Patient Location): Patient's home  Distant Site (Provider Location): Provider Remote Setting  Consent:  The patient has verbally consented to: the potential risks and benefits of telemedicine versus in person care with special emphasis on confidentiality given family members in the home.  Attestation: As the provider I attest to compliance with applicable laws and regulations related to telemedicine.    Encounter Diagnoses   Name Primary?     Social anxiety disorder Yes     Illness anxiety disorder      Moderate recurrent major depression (H)      Illness anxiety disorder: Fear of remigio infections, maria l. COVID-19, care-seeking type, with panic attacks     Assessment (current symptoms):  PHQ 12/16/2021 1/3/2022 1/17/2022   PHQ-9 Total Score 11 12 13   Q9: Thoughts of better off dead/self-harm past 2 weeks Not at all Not at all Not at all     ANEL-7 SCORE 10/5/2021 12/16/2021 " "1/3/2022   Total Score - 16 (severe anxiety) -   Total Score 12 16 14     Today, reports doing \"fairly well\"; no restlessness, irritability.  Reports some improvement in checking / washing behavior.    Updates since last visit:   Pt reports the following improvements in compulsions:    Was able to reduce handwashing in shower from 3x to once    Reduced shower time from 60 to 25min    Hand soap lasts 2-3 weeks (instead of 1 week)    Went for a whole week with no telehealth visit (not seeking re-assurance if she knows the answer)  Remaining struggles with    Eating: calorie intake/weight (106lbs) still on low end, only eats one meal day     Delays eating due to depression    Still feels frustrated at some rituals    Treatment strategies:     Explored the above behaviors /avoidance utilizing cognitive-behavioral, ERP and  acceptance-based techniques.    Discussed self-kindness, compassion, and Action before Motivation BA strategy     Discussed homework    HW: continue working on the following goals:    eliminate handwashing in shower    further reduce hand washing to 20sec per time.     Continue with reducing avoidance behavior regarding food intake (sit at dining room table, use utensils).     Prioritize healthy foods and increase caloric intake.    Order fruit and veggies snacks online    Hierarchy of COVID- related Exposure Exercises (outside of apartment wears K95 + fabric mask + shield)    1. 4-6 - shower: no hand-washing while showering (made significant progress)  2. 4 - driving in the car by self without mask (drove to pharmacy, and curb-side pick-up. masked)  3. 5-6 - driving with cousin (gualberto with partner, no mask, no shield, was able to touch him after he got gas and used only hand )  4. 5-6 Walking outside (with people nearby)  5. 6 - curbside pickup (7 - if I have to talk to them and roll down window)  6. 6-7 Getting in the elevator by herself  7. 7- Picking up a package from   8. 7-8 - " seeing/ walking close by a smoker outsides  9. 8 Sharing an elevator with triple masks  10. 8 - shopping at grocery store    Compulsions (no particular order):    Re-assurance seeking from nurse hotline, sister    Getting tested    Shower handwashing rituals    Excessive hand-washing when outside of apartment    Washing / Applying Lysol to new items (food and otherwise)    Treatment Plan:     Complete between session activation assignments:    Continue CBT /ERP/ACT for Illness anxiety and depression    Continue with psych medication management.       MENTAL STATUS EXAM                                                                                       Alertness: alert  and oriented  Appearance: well groomed  Behavior/Demeanor: polite, quiet, cooperative and pleasant with good  eye contact   Speech: regular rate and rhythm, low volume  Language: intact  Psychomotor: normal or unremarkable  Mood: anxious, euthymic  Affect: restricted; was congruent to mood; was congruent to content  Thought Process/Associations: unremarkable  Thought Content:  Reports worries;  Denies suicidal ideation and violent ideation  Perception: Denies auditory hallucinations and visual hallucinations  Insight: good  Judgment: good  Cognition: (6) does  appear grossly intact; formal cognitive testing was not done  Gait and Station: not assessed due to telehealth format    Courtney Bunch, PhD. LP

## 2022-01-31 ENCOUNTER — VIRTUAL VISIT (OUTPATIENT)
Dept: PSYCHIATRY | Facility: CLINIC | Age: 27
End: 2022-01-31
Payer: COMMERCIAL

## 2022-01-31 DIAGNOSIS — F33.1 MODERATE RECURRENT MAJOR DEPRESSION (H): ICD-10-CM

## 2022-01-31 DIAGNOSIS — F40.10 SOCIAL ANXIETY DISORDER: ICD-10-CM

## 2022-01-31 DIAGNOSIS — F45.21 ILLNESS ANXIETY DISORDER: Primary | ICD-10-CM

## 2022-01-31 NOTE — PROGRESS NOTES
"Clinician Contact & Progress Note  Department of Psychiatry & Behavioral Sciences  [facility]      Date of Service: Jan 31, 2022  Duration of interview with patient: Start Time: 1400; Stop Time: 1500  Provider: Courtney Bunch, PhD,   Psychiatrist: None  PCP: Faina Hopkins  Other Providers: None  Referred by self     Identifying Data:  Gus Regalado is a 26 year old female who prefers the name of \"Clarisse\". The following information was obtained through a clinical interview, self-report questionnaires, and chart review. For more information, see Diagnostic Evaluation by Courtney Bunch, PhD on 12/3/2019.    Telehealth Video Visit AddOn  Type of service: Telemedicine Diagnostic Assessment for anxiety.  The patient's condition can be safely assessed and treated via synchronous audio and visual telemedicine encounter.    Mode of Communication:  Video Conference via [employer]   Reason for Telemedicine Visit: This patient visit was converted to a Telehealth video visit to minimize exposure to COVID-19.  Originating Site (Patient Location): Patient's home  Distant Site (Provider Location): Provider Remote Setting  Consent:  The patient has verbally consented to: the potential risks and benefits of telemedicine versus in person care with special emphasis on confidentiality given family members in the home.  Attestation: As the provider I attest to compliance with applicable laws and regulations related to telemedicine.    Encounter Diagnoses   Name Primary?     Illness anxiety disorder Yes     Moderate recurrent major depression (H)      Social anxiety disorder      Illness anxiety disorder: Fear of remigio infections, maria l. COVID-19, care-seeking type, with panic attacks     Assessment (current symptoms):  PHQ 12/16/2021 1/3/2022 1/17/2022   PHQ-9 Total Score 11 12 13   Q9: Thoughts of better off dead/self-harm past 2 weeks Not at all Not at all Not at all     ANEL-7 SCORE 10/5/2021 12/16/2021 " "1/3/2022   Total Score - 16 (severe anxiety) -   Total Score 12 16 14     Today, reports doing \"fairly well\"    Updates since last visit:   Pt reports the following updates:    improved with handwashing    laundry (able to re-use clothing that fell on the floor)    Remaining: after passing stool, still using mirror.    Saw Dermatologist about 1 week ago. Dermatologist recommended:    Reduce showers to 2-3 x/week    Eliminate (or greatly reduce) soap - avoid showers if cannot reduce handsoap soap    Was dx with Contact dermatitis (not scabies, eczema, scabies) - \"I trust Drs opinion\"    Was able to step into the bed without showering    \"I saw skin improving. It helped motivate me\"    Used restroom at [employer] (first public in 2 years)    Able to keep hand-washing to20 sec every time    Gained 2 lbs, which is good    Still has headaches due to not drinking enough water    Treatment strategies:     Explored the above items utilizing cognitive-behavioral, ERP and  acceptance-based techniques.    Discussed homework    HW: continue working on the following goals:    eliminate handwashing in shower    further reduce hand washing to 20sec per time.     Continue with reducing avoidance behavior regarding food intake (sit at dining room table, use utensils).     Prioritize healthy foods and increase caloric intake.    Order fruit and veggies snacks online    Hierarchy of COVID- related Exposure Exercises (outside of apartment wears K95 + fabric mask + shield)    1. 4-6 - shower: no hand-washing while showering (made significant progress)  2. 4 - driving in the car by self without mask (drove to pharmacy, and curb-side pick-up. masked)  3. 5-6 - driving with cousin (gualberto with partner, no mask, no shield, was able to touch him after he got gas and used only hand )  4. 5-6 Walking outside (with people nearby)  5. 6 - curbside pickup (7 - if I have to talk to them and roll down window)  6. 6-7 Getting in the elevator by " herself  7. 7- Picking up a package from   8. 7-8 - seeing/ walking close by a smoker outsides  9. 8 Sharing an elevator with triple masks  10. 8 - shopping at grocery store    Compulsions (no particular order):    Re-assurance seeking from nurse hotline, sister    Getting tested    Shower handwashing rituals    Excessive hand-washing when outside of apartment    Washing / Applying Lysol to new items (food and otherwise)    Treatment Plan:     Complete between session activation assignments:    Continue CBT /ERP/ACT for Illness anxiety and depression    Continue with psych medication management.       MENTAL STATUS EXAM                                                                                       Alertness: alert  and oriented  Appearance: well groomed  Behavior/Demeanor: polite, quiet, cooperative and pleasant with good  eye contact   Speech: regular rate and rhythm, low volume  Language: intact  Psychomotor: normal or unremarkable  Mood: mostly consistent with euthymic  Affect: restricted; was congruent to mood; was congruent to content  Thought Process/Associations: unremarkable  Thought Content:  Reports worries;  Denies suicidal ideation and violent ideation  Perception: Denies auditory hallucinations and visual hallucinations  Insight: good  Judgment: good  Cognition: (6) does  appear grossly intact; formal cognitive testing was not done  Gait and Station: not assessed due to telehealth format    Courtney Bunch, PhD. LP

## 2022-02-08 DIAGNOSIS — F42.2 MIXED OBSESSIONAL THOUGHTS AND ACTS: ICD-10-CM

## 2022-02-09 RX ORDER — FLUOXETINE 40 MG/1
40 CAPSULE ORAL DAILY
Qty: 30 CAPSULE | Refills: 0 | Status: SHIPPED | OUTPATIENT
Start: 2022-02-09 | End: 2022-02-17

## 2022-02-09 NOTE — TELEPHONE ENCOUNTER
Medication requested: FLUoxetine (PROZAC) 40 MG capsule   Last refilled: 12/16/21  Qty: 30/1      Last seen: 12/16/21  RTC: 4-6 weeks  Cancel: 0  No-show: 0  Next appt: 2/17/22    Refill decision:   Refilled for 30 days per protocol.

## 2022-02-17 ENCOUNTER — VIRTUAL VISIT (OUTPATIENT)
Dept: PSYCHIATRY | Facility: CLINIC | Age: 27
End: 2022-02-17
Attending: PSYCHIATRY & NEUROLOGY
Payer: COMMERCIAL

## 2022-02-17 DIAGNOSIS — F42.2 MIXED OBSESSIONAL THOUGHTS AND ACTS: ICD-10-CM

## 2022-02-17 PROCEDURE — 99214 OFFICE O/P EST MOD 30 MIN: CPT | Mod: GT | Performed by: STUDENT IN AN ORGANIZED HEALTH CARE EDUCATION/TRAINING PROGRAM

## 2022-02-17 RX ORDER — FLUOXETINE 40 MG/1
40 CAPSULE ORAL DAILY
Qty: 30 CAPSULE | Refills: 1 | Status: SHIPPED | OUTPATIENT
Start: 2022-02-17
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
## 2022-02-17 NOTE — PATIENT INSTRUCTIONS
It was nice seeing you today    Treatment Plan Today:     1) Medications-  -Continue Fluoxetine 40 mg daily    2) Follow-up appt with Dr Oliveira 3/31/22    3) Crisis numbers are below and clinic after hours number is [phone]      **For crisis resources, please see the information at the end of this document**     Patient Education    Thank you for coming to the [facility] [place] [facility].    Lab Testing:  If you had lab testing today and your results are reassuring or normal they will be mailed to you or sent through [employer] within 7 days. If the lab tests need quick action we will call you with the results. The phone number we will call with results is # [phone] (home) . If this is not the best number please call our clinic and change the number.    Medication Refills:  If you need any refills please call your pharmacy and they will contact us. Our fax number for refills is [phone]. Please allow three business for refill processing. If you need to  your refill at a new pharmacy, please contact the new pharmacy directly. The new pharmacy will help you get your medications transferred.     Scheduling:  If you have any concerns about today's visit or wish to schedule another appointment please call our office during normal business hours [phone] (8-5:00 M-F)    Contact Us:  Please call [phone] during business hours (8-5:00 M-F).  If after clinic hours, or on the weekend, please call  [phone].    Financial Assistance [phone]  [facility] Billing [phone]  Central Billing Office, [facility]: [phone]  [facility] Billing [phone]  Medical Records [phone]       MENTAL HEALTH CRISIS NUMBERS:  For a medical emergency please call  911 or go to the nearest ER.     [facility]:   [facility] -[phone]   Crisis Residence [facility] -[phone]   Walk-In Counseling Center [facility] -[phone]   COPE 24/7  [facility] Mobile Team -[phone] (adults)/[phone] (child)  CHILD: [facility] needs assessment team - [phone]      [facility] - [phone]   Walk-in counseling [facility] - [phone]   Walk-in counseling [facility] - [phone]   Crisis Residence [facility] Residence - [phone]  Urgent Care Adult Mental [facility]-[phone] mobile unit/ 24/7 crisis line    National Crisis Numbers:   National Suicide Prevention Lifeline: 2-742-078-TALK (982-588-9384)  Poison Control Center - 5-692-445-2185  [employer]/resources for a list of additional resources (SOS)  Trans Lifeline a hotline for transgender people 8-074-461-1470  [employer] a hotline for LGBT youth 9-489-135-7659  Crisis Text Line: For any crisis 24/7   To: 430937  see www.crisistextline.org  - IF MAKING A CALL FEELS TOO HARD, send a text!

## 2022-04-04 ENCOUNTER — VIRTUAL VISIT (OUTPATIENT)
Dept: PSYCHIATRY | Facility: CLINIC | Age: 27
End: 2022-04-04
Payer: COMMERCIAL

## 2022-04-04 DIAGNOSIS — F42.2 MIXED OBSESSIONAL THOUGHTS AND ACTS: Primary | ICD-10-CM

## 2022-04-04 DIAGNOSIS — F33.1 MODERATE RECURRENT MAJOR DEPRESSION (H): ICD-10-CM

## 2022-04-04 ASSESSMENT — ANXIETY QUESTIONNAIRES
3. WORRYING TOO MUCH ABOUT DIFFERENT THINGS: NEARLY EVERY DAY
7. FEELING AFRAID AS IF SOMETHING AWFUL MIGHT HAPPEN: SEVERAL DAYS
IF YOU CHECKED OFF ANY PROBLEMS ON THIS QUESTIONNAIRE, HOW DIFFICULT HAVE THESE PROBLEMS MADE IT FOR YOU TO DO YOUR WORK, TAKE CARE OF THINGS AT HOME, OR GET ALONG WITH OTHER PEOPLE: SOMEWHAT DIFFICULT
6. BECOMING EASILY ANNOYED OR IRRITABLE: MORE THAN HALF THE DAYS
GAD7 TOTAL SCORE: 11
1. FEELING NERVOUS, ANXIOUS, OR ON EDGE: MORE THAN HALF THE DAYS
4. TROUBLE RELAXING: SEVERAL DAYS
2. NOT BEING ABLE TO STOP OR CONTROL WORRYING: MORE THAN HALF THE DAYS
5. BEING SO RESTLESS THAT IT IS HARD TO SIT STILL: NOT AT ALL

## 2022-04-04 ASSESSMENT — PATIENT HEALTH QUESTIONNAIRE - PHQ9: SUM OF ALL RESPONSES TO PHQ QUESTIONS 1-9: 16

## 2022-04-04 NOTE — PROGRESS NOTES
"Clinician Contact & Progress Note  Department of Psychiatry & Behavioral Sciences  [facility]      Date of Service: Apr 4, 2022  Duration of interview with patient: Start Time: 1400; Stop Time: 1500  Provider: Courtney Bunch, PhD,   Psychiatrist: None  PCP: Faina Hopkins  Other Providers: None  Referred by self     Identifying Data:  Gus Regalado is a 26 year old female who prefers the name of \"Clarisse\". The following information was obtained through a clinical interview, self-report questionnaires, and chart review. For more information, see Diagnostic Evaluation by Courtney Bunch, PhD on 12/3/2019.    Telehealth Video Visit AddOn  Type of service: Telemedicine Diagnostic Assessment for anxiety.  The patient's condition can be safely assessed and treated via synchronous audio and visual telemedicine encounter.    Mode of Communication:  Video Conference via [email or web address]   Reason for Telemedicine Visit: This patient visit was converted to a Telehealth video visit to minimize exposure to COVID-19.  Originating Site (Patient Location): Patient's home  Distant Site (Provider Location): Provider Remote Setting  Consent:  The patient has verbally consented to: the potential risks and benefits of telemedicine versus in person care with special emphasis on confidentiality given family members in the home.  Attestation: As the provider I attest to compliance with applicable laws and regulations related to telemedicine.    Encounter Diagnoses   Name Primary?     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 COVID-19, care-seeking type, with panic attacks     Assessment (current symptoms):  PHQ 1/3/2022 1/17/2022 4/4/2022   PHQ-9 Total Score 12 13 16   Q9: Thoughts of better off dead/self-harm past 2 weeks Not at all Not at all Several days     ANEL-7 SCORE 12/16/2021 1/3/2022 4/4/2022   Total Score 16 " "(severe anxiety) - -   Total Score 16 14 11     Today, reports vast improvements in OCD and mild increase in dep, anhedonia, loneliness    Updates since last visit:   Pt reports the following updates:    Many improvements: going in to work, attends meetings with others not wearing a mask, now without wearing a mask herself     Booked trip to [place] in mid-late May    Still ritualizing in the shower    Gained weight due to increase in caloric intake, currently at 115lbs, which is in the normal range for her. Yet, pt feels ashamed to say it as she used to be 103lbs.    Has been donating blood for helping others. However, also notes feeling \"relieve\" when blood leaves the body    \"I'd like to donate all my organs too, to others who get more use out of them (live better, want to live)\"    Treatment strategies:     Explored the above items utilizing cognitive-behavioral, ERP and  acceptance-based techniques.    Generated activities that helped: camping, pet-sitting at 's for 2 weeks and was noting that she did not get sick (at 's with out checking tools like mirror and UV ), going in to work and learning \"nothing happened\" .    Performed in-session exposure to cold sore (Herpes) virus    Explored self-worth and value. Discussed giving blood and donating organs while promoting self-care and self-like. \"making the best use of the organs\"    discussed eating behavior, feelings and thoughts    Discussed homework: daily exposure exercise    HW: continue working on the following goals:    further reduce hand washing to 20sec per time.     Write about cold sore virus    Hierarchy of COVID- related Exposure Exercises (outside of apartment wears K95 + fabric mask + shield)    1. 4-6 - shower: no hand-washing while showering (made significant progress)  2. DONE 4 - driving in the car by self without mask (drove to pharmacy, and curb-side pick-up. masked)  3. DONE 5-6 - driving with cousin (rode with partner, no mask, " no shield, was able to touch him after he got gas and used only hand )  4. DONE 5-6 Walking outside (with people nearby)  5. DONE 6 - curbside pickup (7 - if I have to talk to them and roll down window)  6. DONE 6-7 Getting in the elevator by herself  7. DONE 7- Picking up a package from   8. 7-8 - seeing/ walking close by a smoker outsides  9. DONE 8 Sharing an elevator with triple masks  10. DONE 8 - shopping at grocery store  11. DONE return to work, attend In Person meetings  12. DONE eat lunch at work    Compulsions (no particular order):    Re-assurance seeking from nurse hotline, sister    Getting tested    Shower handwashing rituals    Excessive hand-washing when outside of apartment    Washing / Applying Lysol to new items (food and otherwise)    Treatment Plan:     Complete between session activation assignments:    Continue CBT /ERP/ACT for Illness anxiety and depression    Continue with psych medication management.       MENTAL STATUS EXAM                                                                                       Alertness: alert  and oriented  Appearance: well groomed  Behavior/Demeanor: polite, quiet, cooperative and pleasant with good  eye contact   Speech: regular rate and rhythm, low volume  Language: intact  Psychomotor: normal or unremarkable  Mood: intermittent moderate dysthymia   Affect: restricted; was congruent to mood; was congruent to content  Thought Process/Associations: unremarkable  Thought Content:  Reports worries;  Denies suicidal ideation and violent ideation  Perception: Denies auditory hallucinations and visual hallucinations  Insight: good  Judgment: good  Cognition: (6) does  appear grossly intact; formal cognitive testing was not done  Gait and Station: not assessed due to telehealth format    Courtney Bunch, PhD. LP

## 2022-04-05 ASSESSMENT — ANXIETY QUESTIONNAIRES: GAD7 TOTAL SCORE: 11

## 2022-04-18 ENCOUNTER — VIRTUAL VISIT (OUTPATIENT)
Dept: PSYCHIATRY | Facility: CLINIC | Age: 27
End: 2022-04-18
Payer: COMMERCIAL

## 2022-04-18 DIAGNOSIS — F42.2 MIXED OBSESSIONAL THOUGHTS AND ACTS: Primary | ICD-10-CM

## 2022-04-18 DIAGNOSIS — F40.10 SOCIAL ANXIETY DISORDER: ICD-10-CM

## 2022-04-18 NOTE — PROGRESS NOTES
"Clinician Contact & Progress Note  Department of Psychiatry & Behavioral Sciences  [facility]      Date of Service: Apr 18, 2022  Duration of interview with patient: Start Time: 1400; Stop Time: 1500  Provider: Courtney Bunch, PhD,   Psychiatrist: None  PCP: Faina oHpkins  Other Providers: None  Referred by self     Identifying Data:  Gus Regalado is a 26 year old female who prefers the name of \"Clarisse\". The following information was obtained through a clinical interview, self-report questionnaires, and chart review. For more information, see Diagnostic Evaluation by Courtney Bunch, PhD on 12/3/2019.    Telehealth Video Visit AddOn  Type of service: Telemedicine Diagnostic Assessment for anxiety.  The patient's condition can be safely assessed and treated via synchronous audio and visual telemedicine encounter.    Mode of Communication:  Video Conference via [employer]   Reason for Telemedicine Visit: This patient visit was converted to a Telehealth video visit to minimize exposure to COVID-19.  Originating Site (Patient Location): Patient's home  Distant Site (Provider Location): Provider Remote Setting  Consent:  The patient has verbally consented to: the potential risks and benefits of telemedicine versus in person care with special emphasis on confidentiality given family members in the home.  Attestation: As the provider I attest to compliance with applicable laws and regulations related to telemedicine.    Encounter Diagnoses   Name Primary?     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, care-seeking type, with panic attacks     Assessment (current symptoms):  PHQ 1/3/2022 1/17/2022 4/4/2022   PHQ-9 Total Score 12 13 16   Q9: Thoughts of better off dead/self-harm past 2 weeks Not at all Not at all Several days     ANEL-7 SCORE 12/16/2021 1/3/2022 4/4/2022   Total Score 16 (severe anxiety) " "- -   Total Score 16 14 11     Today, reports that \"in the last couple of weeks I felt a bit better\",   reports meds may help with anxiety and OCD, but not much with depression/sadness (not significant change noticed)    Updates since last visit:   Pt reports the following updates:    Still preoccupied with herpes virus    Pressure from family leading to sacrifices on pt's end    Treatment strategies:     Explored the above items utilizing cognitive-behavioral, ERP and  acceptance-based techniques.    Hx of avoiding conflicts, fear of loud noises    Depression related to low self-image, self esteem/-worth, which may in turn be related to taking the brunt of family conflict (e.g., she'd mediate anticipated conflicts between mom and sister over loud music in the car by faking a headache)    Hierarchy of COVID- related Exposure Exercises (outside of apartment wears K95 + fabric mask + shield)    1. 4-6 - shower: no hand-washing while showering (made significant progress)  2. DONE 4 - driving in the car by self without mask (drove to pharmacy, and curb-side pick-up. masked)  3. DONE 5-6 - driving with cousin (rode with partner, no mask, no shield, was able to touch him after he got gas and used only hand )  4. DONE 5-6 Walking outside (with people nearby)  5. DONE 6 - curbside pickup (7 - if I have to talk to them and roll down window)  6. DONE 6-7 Getting in the elevator by herself  7. DONE 7- Picking up a package from   8. 7-8 - seeing/ walking close by a smoker outsides  9. DONE 8 Sharing an elevator with triple masks  10. DONE 8 - shopping at grocery store  11. DONE return to work, attend In Person meetings  12. DONE eat lunch at work    Compulsions (no particular order):    Re-assurance seeking from nurse hotline, sister    Getting tested    Shower handwashing rituals    Excessive hand-washing when outside of apartment    Washing / Applying Lysol to new items (food and otherwise)    Treatment " Plan:     Complete between session activation assignments:    Continue CBT /ERP/ACT for Illness anxiety and depression    Continue with psych medication management.     RTC: 1 week      MENTAL STATUS EXAM                                                                                       Alertness: alert  and oriented  Appearance: well groomed  Behavior/Demeanor: polite, quiet, cooperative and pleasant with good  eye contact   Speech: regular rate and rhythm, low volume  Language: intact  Psychomotor: normal or unremarkable  Mood: intermittent moderate dysthymia   Affect: restricted; was congruent to mood; was congruent to content  Thought Process/Associations: unremarkable  Thought Content:  Reports worries;  Denies suicidal ideation and violent ideation  Perception: Denies auditory hallucinations and visual hallucinations  Insight: good  Judgment: good  Cognition: (6) does  appear grossly intact; formal cognitive testing was not done  Gait and Station: not assessed due to telehealth format    Courtney Bunch, PhD. LP

## 2022-05-02 ENCOUNTER — VIRTUAL VISIT (OUTPATIENT)
Dept: PSYCHIATRY | Facility: CLINIC | Age: 27
End: 2022-05-02
Payer: COMMERCIAL

## 2022-05-02 DIAGNOSIS — F40.10 SOCIAL ANXIETY DISORDER: ICD-10-CM

## 2022-05-02 DIAGNOSIS — F42.2 MIXED OBSESSIONAL THOUGHTS AND ACTS: Primary | ICD-10-CM

## 2022-05-02 NOTE — PROGRESS NOTES
"Clinician Contact & Progress Note  Department of Psychiatry & Behavioral Sciences  [facility]      Date of Service: May 2, 2022  Duration of interview with patient: Start Time: 1400; Stop Time: 1500  Provider: Courtney Bunch, PhD,   Psychiatrist: None  PCP: Faina Hopkins  Other Providers: None  Referred by self     Identifying Data:  Gus Regalado is a 26 year old female who prefers the name of \"Clarisse\". The following information was obtained through a clinical interview, self-report questionnaires, and chart review. For more information, see Diagnostic Evaluation by Courtney Bunch, PhD on 12/3/2019.    Telehealth Video Visit AddOn  Type of service: Telemedicine Diagnostic Assessment for anxiety.  The patient's condition can be safely assessed and treated via synchronous audio and visual telemedicine encounter.    Mode of Communication:  Video Conference via [employer]   Reason for Telemedicine Visit: This patient visit was converted to a Telehealth video visit to minimize exposure to COVID-19.  Originating Site (Patient Location): Patient's home  Distant Site (Provider Location): Provider Remote Setting  Consent:  The patient has verbally consented to: the potential risks and benefits of telemedicine versus in person care with special emphasis on confidentiality given family members in the home.  Attestation: As the provider I attest to compliance with applicable laws and regulations related to telemedicine.    Encounter Diagnoses   Name Primary?     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    Updates since last visit:   Pt reports the following updates:    Still preoccupied with yeast infection    Treatment strategies:      Explored the above items utilizing cognitive-behavioral, ERP and acceptance-based techniques.    Urge to act on fear of having yeast infection (checking self, with Dr)    Lots of thoughts around this (frequently \"go down the rabbit hole, have exploding thoughts, spend 4+ hours/day 'researching' potential implication of imaginary yeast infection\")    Has not disclosed this concern to partner    We conducted an session exposure exercise. Wrote exposure script. SUDS peaked at 8/10.    Assigned as daily homework    Homework:      Daily exposure exercises to script (20min or until SUDS down by 50%, whichever comes first)       Hierarchy of COVID- related Exposure Exercises (outside of apartment wears K95 + fabric mask + shield)    1. 4-6 - shower: no hand-washing while showering (made significant progress)  2. DONE 4 - driving in the car by self without mask (drove to pharmacy, and curb-side pick-up. masked)  3. DONE 5-6 - driving with cousin (rode with partner, no mask, no shield, was able to touch him after he got gas and used only hand )  4. DONE 5-6 Walking outside (with people nearby)  5. DONE 6 - curbside pickup (7 - if I have to talk to them and roll down window)  6. DONE 6-7 Getting in the elevator by herself  7. DONE 7- Picking up a package from   8. 7-8 - seeing/ walking close by a smoker outsides  9. DONE 8 Sharing an elevator with triple masks  10. DONE 8 - shopping at grocery store  11. DONE return to work, attend In Person meetings  12. DONE eat lunch at work    Compulsions (no particular order):    Re-assurance seeking from nurse hotline, " sister    Getting tested    Shower handwashing rituals    Excessive hand-washing when outside of apartment    Washing / Applying Lysol to new items (food and otherwise)    Treatment Plan:     Complete between session activation assignments:    Continue CBT /ERP/ACT for Illness anxiety and depression    Continue with psych medication management.     RTC: 1 week      MENTAL STATUS EXAM                                                                                       Alertness: alert  and oriented  Appearance: well groomed  Behavior/Demeanor: polite, quiet, cooperative and pleasant with good  eye contact   Speech: regular rate and rhythm, low volume  Language: intact  Psychomotor: normal or unremarkable  Mood: intermittent moderate dysthymia   Affect: restricted; was congruent to mood; was congruent to content  Thought Process/Associations: unremarkable  Thought Content:  Reports worries;  Denies suicidal ideation and violent ideation  Perception: Denies auditory hallucinations and visual hallucinations  Insight: good  Judgment: good  Cognition: (6) does  appear grossly intact; formal cognitive testing was not done  Gait and Station: not assessed due to telehealth format    Courtney Bunch, PhD. LP

## 2022-05-09 ENCOUNTER — VIRTUAL VISIT (OUTPATIENT)
Dept: PSYCHIATRY | Facility: CLINIC | Age: 27
End: 2022-05-09
Payer: COMMERCIAL

## 2022-05-09 DIAGNOSIS — F42.2 MIXED OBSESSIONAL THOUGHTS AND ACTS: Primary | ICD-10-CM

## 2022-05-09 DIAGNOSIS — F33.1 MODERATE RECURRENT MAJOR DEPRESSION (H): ICD-10-CM

## 2022-05-09 DIAGNOSIS — F40.10 SOCIAL ANXIETY DISORDER: ICD-10-CM

## 2022-05-09 NOTE — PROGRESS NOTES
"Clinician Contact & Progress Note  Department of Psychiatry & Behavioral Sciences  [facility]      Date of Service: May 9, 2022  Duration of interview with patient: Start Time: 1400; Stop Time: 1500  Provider: Courtney Bunch, PhD,   Psychiatrist: None  PCP: Faina Hopkins  Other Providers: None  Referred by self     Identifying Data:  Gus Regalado is a 26 year old female who prefers the name of \"Clarisse\". The following information was obtained through a clinical interview, self-report questionnaires, and chart review. For more information, see Diagnostic Evaluation by Courtney Bunch, PhD on 12/3/2019.    Telehealth Video Visit AddOn  Type of service: Telemedicine Diagnostic Assessment for anxiety.  The patient's condition can be safely assessed and treated via synchronous audio and visual telemedicine encounter.    Mode of Communication:  Video Conference via [employer]   Reason for Telemedicine Visit: This patient visit was converted to a Telehealth video visit to minimize exposure to COVID-19.  Originating Site (Patient Location): Patient's home  Distant Site (Provider Location): Provider Remote Setting  Consent:  The patient has verbally consented to: the potential risks and benefits of telemedicine versus in person care with special emphasis on confidentiality given family members in the home.  Attestation: As the provider I attest to compliance with applicable laws and regulations related to telemedicine.    Encounter Diagnoses   Name Primary?     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 Praised and discussed difference between exposure to fear and reassuring effect of bf's response.    Going to [place] on Wed, fear of travel related to COVID (plane, eating out)    Will meet sister and stay with close friends    Treatment strategies:      Explored the above items utilizing cognitive-behavioral, ERP and acceptance-based techniques.    Will be staying with 2 best friends who moved to [place], who are in conflict. Being assertiveness in the triangle and not take responsibility for solving the conflict.    Practiced interactions with friends, assertiveness communication and delegating responsibilities.    Discussed seeking out opportunities for natural/spontaneous as well as planned exposure exercises.    Homework:      Daily exposure exercises to script (20min or until SUDS down by 50%, whichever comes first)       Hierarchy of COVID- related Exposure Exercises (outside of apartment wears K95 + fabric mask + shield)    1. 4-6 - shower: no hand-washing while showering (made significant progress)  2. DONE 4 - driving in the car by self without mask (drove to pharmacy, and curb-side pick-up. masked)  3. DONE 5-6 - driving with cousin (rode with partner, no mask, no shield, was able to touch him after he got gas and used only hand )  4. DONE 5-6 Walking outside (with people nearby)  5. DONE 6 - curbside pickup (7 - if I have to talk to them and roll down window)  6. DONE 6-7 Getting in the elevator by herself  7. DONE 7- Picking up a package from   8. 7-8 - seeing/ walking close by a smoker outsides  9. DONE 8 Sharing an elevator with triple masks  10. DONE 8 - shopping at grocery store  11. DONE  return to work, attend In Person meetings  12. DONE eat lunch at work    Compulsions (no particular order):    Re-assurance seeking from nurse hotline, sister    Getting tested    Shower handwashing rituals    Excessive hand-washing when outside of apartment    Washing / Applying Lysol to new items (food and otherwise)    Treatment Plan:     Complete between session activation assignments:    Continue CBT /ERP/ACT for Illness anxiety and depression    Continue with psych medication management.     RTC: 1 week      MENTAL STATUS EXAM                                                                                       Alertness: alert  and oriented  Appearance: well groomed  Behavior/Demeanor: polite, quiet, cooperative and pleasant with good  eye contact   Speech: regular rate and rhythm, low volume  Language: intact  Psychomotor: normal or unremarkable  Mood: intermittent moderate dysthymia   Affect: restricted; was congruent to mood; was congruent to content  Thought Process/Associations: unremarkable  Thought Content:  Reports worries;  Denies suicidal ideation and violent ideation  Perception: Denies auditory hallucinations and visual hallucinations  Insight: good  Judgment: good  Cognition: (6) does  appear grossly intact; formal cognitive testing was not done  Gait and Station: not assessed due to telehealth format    Courtney Bunch, PhD. LP

## 2022-05-23 ENCOUNTER — VIRTUAL VISIT (OUTPATIENT)
Dept: PSYCHIATRY | Facility: CLINIC | Age: 27
End: 2022-05-23
Payer: COMMERCIAL

## 2022-05-23 DIAGNOSIS — F42.2 MIXED OBSESSIONAL THOUGHTS AND ACTS: Primary | ICD-10-CM

## 2022-05-23 DIAGNOSIS — F33.1 MODERATE RECURRENT MAJOR DEPRESSION (H): ICD-10-CM

## 2022-05-23 DIAGNOSIS — F40.10 SOCIAL ANXIETY DISORDER: ICD-10-CM

## 2022-05-23 NOTE — PROGRESS NOTES
"Clinician Contact & Progress Note  Department of Psychiatry & Behavioral Sciences  [facility]      Date of Service: May 23, 2022  Duration of interview with patient: Start Time: 14:13; Stop Time: 15:06  Provider: Courtney Bunch, PhD,   Psychiatrist: None  PCP: Faina Hopkins  Other Providers: None  Referred by self     Identifying Data:  Gus Regalado is a 26 year old female who prefers the name of \"Clarisse\". The following information was obtained through a clinical interview, self-report questionnaires, and chart review. For more information, see Diagnostic Evaluation by Courtney Bunch, PhD on 12/3/2019.    Telehealth Video Visit AddOn  Type of service: Telemedicine Diagnostic Assessment for anxiety.  The patient's condition can be safely assessed and treated via synchronous audio and visual telemedicine encounter.    Mode of Communication:  Video Conference via [email or web address]   Reason for Telemedicine Visit: This patient visit was converted to a Telehealth video visit to minimize exposure to COVID-19.  Originating Site (Patient Location): Patient's home  Distant Site (Provider Location): Provider Remote Setting  Consent:  The patient has verbally consented to: the potential risks and benefits of telemedicine versus in person care with special emphasis on confidentiality given family members in the home.  Attestation: As the provider I attest to compliance with applicable laws and regulations related to telemedicine.    Encounter Diagnoses   Name Primary?     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, care-seeking type, with panic attacks     Assessment (current symptoms):  PHQ 1/3/2022 1/17/2022 4/4/2022   PHQ-9 Total Score 12 13 16   Q9: Thoughts of better off dead/self-harm past 2 weeks Not at all Not at all Several days     ANEL-7 SCORE 12/16/2021 " "1/3/2022 4/4/2022   Total Score 16 (severe anxiety) - -   Total Score 16 14 11     Today, less anxious, reports no significant changes in OCD, depressed    Updates since last visit:   Pt reports the following updates:    Just returned from trip to [place], which was very stressful due to OCD, anxious about family, even anxious about 2 friends (OCD, their conflict, lack of routine, not used to that much activity and talking)    Planned day off work today to recover from trip to [place], plans to go in to work tomorrow    \"Not ready to socialize, too much talking\"    Treatment strategies:      Explored the above items utilizing cognitive-behavioral, ERP and acceptance-based techniques:    [place] trip: experiences with triggers, thoughts, feelings (anxiety), behaviors (\"forcing self to be social\") -> explored what worked and how it worked.    Explored consequences:  I just want to sleep - not think or do anything, I wish I could just sleep all day and all night    Worked on reframing as well as limit setting, assertiveness, self-protective behaviors    Homework:      Daily exposure exercises to script (20min or until SUDS down by 50%, whichever comes first)       Hierarchy of COVID- related Exposure Exercises (outside of apartment wears K95 + fabric mask + shield)    1. 4-6 - shower: no hand-washing while showering (made significant progress)  2. DONE 4 - driving in the car by self without mask (drove to pharmacy, and curb-side pick-up. masked)  3. DONE 5-6 - driving with cousin (rode with partner, no mask, no shield, was able to touch him after he got gas and used only hand )  4. DONE 5-6 Walking outside (with people nearby)  5. DONE 6 - curbside pickup (7 - if I have to talk to them and roll down window)  6. DONE 6-7 Getting in the elevator by herself  7. DONE 7- Picking up a package from   8. 7-8 - seeing/ walking close by a smoker outsides  9. DONE 8 Sharing an elevator with triple " masks  10. DONE 8 - shopping at grocery store  11. DONE return to work, attend In Person meetings  12. DONE eat lunch at work    Compulsions (no particular order):    Re-assurance seeking from nurse hotline, sister    Getting tested    Shower handwashing rituals    Excessive hand-washing when outside of apartment    Washing / Applying Lysol to new items (food and otherwise)    Treatment Plan:     Complete between session activation assignments:    Continue CBT /ERP/ACT for Illness anxiety and depression    Continue with psych medication management.     RTC: 1 week      MENTAL STATUS EXAM                                                                                       Alertness: alert  and oriented  Appearance: well groomed  Behavior/Demeanor: polite, quiet, cooperative and pleasant with good  eye contact   Speech: regular rate and rhythm, low volume  Language: intact  Psychomotor: normal or unremarkable  Mood: intermittent moderate dysthymia   Affect: restricted; was congruent to mood; was congruent to content  Thought Process/Associations: unremarkable  Thought Content:  Reports worries;  Denies suicidal ideation and violent ideation  Perception: Denies auditory hallucinations and visual hallucinations  Insight: good  Judgment: good  Cognition: (6) does  appear grossly intact; formal cognitive testing was not done  Gait and Station: not assessed due to telehealth format    Courtney Bunch, PhD. LP

## 2022-05-30 ENCOUNTER — VIRTUAL VISIT (OUTPATIENT)
Dept: PSYCHIATRY | Facility: CLINIC | Age: 27
End: 2022-05-30
Payer: COMMERCIAL

## 2022-05-30 DIAGNOSIS — F42.2 MIXED OBSESSIONAL THOUGHTS AND ACTS: Primary | ICD-10-CM

## 2022-05-30 DIAGNOSIS — F40.10 SOCIAL ANXIETY DISORDER: ICD-10-CM

## 2022-05-30 NOTE — PROGRESS NOTES
"Clinician Contact & Progress Note  Department of Psychiatry & Behavioral Sciences  [facility]      Date of Service: May 30, 2022  Duration of interview with patient: Start Time: 14:13; Stop Time: 15:06  Provider: Courtney Bunch, PhD,   Psychiatrist: None  PCP: Faina Hopkins  Other Providers: None  Referred by self     Identifying Data:  Gus Regalado is a 26 year old female who prefers the name of \"Clarisse\". The following information was obtained through a clinical interview, self-report questionnaires, and chart review. For more information, see Diagnostic Evaluation by Courtney Bunch, PhD on 12/3/2019.    Telehealth Video Visit AddOn  Type of service: Telemedicine Diagnostic Assessment for anxiety.  The patient's condition can be safely assessed and treated via synchronous audio and visual telemedicine encounter.    Mode of Communication:  Video Conference via [employer]   Reason for Telemedicine Visit: This patient visit was converted to a Telehealth video visit to minimize exposure to COVID-19.  Originating Site (Patient Location): Patient's home  Distant Site (Provider Location): Provider Remote Setting  Consent:  The patient has verbally consented to: the potential risks and benefits of telemedicine versus in person care with special emphasis on confidentiality given family members in the home.  Attestation: As the provider I attest to compliance with applicable laws and regulations related to telemedicine.    Encounter Diagnoses   Name Primary?     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 COVID-19, care-seeking type, with panic attacks     Assessment (current symptoms):  PHQ 1/3/2022 1/17/2022 4/4/2022   PHQ-9 Total Score 12 13 16   Q9: Thoughts of better off dead/self-harm past 2 weeks Not at all Not at all Several days     ANEL-7 SCORE 12/16/2021 1/3/2022 4/4/2022   Total Score 16 (severe " "anxiety) - -   Total Score 16 14 11     Today, less anxious,   reports feeling slightly better than last session, \"still exhausted, no interest in interacting, trying to distance from people, stuck in bed today, even eating seems a haunting task\"    Updates since last visit:   Pt reports the following updates:  - Saw partner last night  - lacking energy, not able to do anything except work (due to fear of disappointing people). It takes much longer to do a task than it did a few months ago  - today, plans to clean her apartment  - attended event Sat night (20 yr anniversary of a couple), avoided interacting with people, kept busy with cleaning and coordinating, so doesn't have to be social    Treatment strategies:      Explored the above items utilizing cognitive-behavioral, ERP and acceptance-based techniques.    Family event as exposure exercise    Discussed and practiced self-advocacy, self-esteem, self-worth strategies (setting limits, considering her values etc)    Pt has been able to work last week (Tue-Fr). \"Able to put on a mask on at work and go about my day. Able to work effectively.    Homework:      exercise before dinner (5:30p) x 3 (Mo, We, Fr): 15 min / day of activity (exercise or clean); HIT video; will ask Devaughn to hold her accountable    Daily exposure exercises to script (20min or until SUDS down by 50%, whichever comes first)     Hierarchy of COVID- related Exposure Exercises (outside of apartment wears K95 + fabric mask + shield)    1. 4-6 - shower: no hand-washing while showering (made significant progress)  2. DONE 4 - driving in the car by self without mask (drove to pharmacy, and curb-side pick-up. masked)  3. DONE 5-6 - driving with cousin (roddarius with partner, no mask, no shield, was able to touch him after he got gas and used only hand )  4. DONE 5-6 Walking outside (with people nearby)  5. DONE 6 - curbside pickup (7 - if I have to talk to them and roll down window)  6. DONE 6-7 " Getting in the elevator by herself  7. DONE 7- Picking up a package from   8. 7-8 - seeing/ walking close by a smoker outsides  9. DONE 8 Sharing an elevator with triple masks  10. DONE 8 - shopping at grocery store  11. DONE return to work, attend In Person meetings  12. DONE eat lunch at work    Compulsions (no particular order):    Re-assurance seeking from nurse hotline, sister    Getting tested    Shower handwashing rituals    Excessive hand-washing when outside of apartment    Washing / Applying Lysol to new items (food and otherwise)    Treatment Plan:     Complete between session activation assignments:    Continue CBT /ERP/ACT for Illness anxiety and depression    Continue with psych medication management.     RTC: 1 week      MENTAL STATUS EXAM                                                                                       Alertness: alert  and oriented  Appearance: well groomed  Behavior/Demeanor: polite, quiet, cooperative and pleasant with good  eye contact   Speech: regular rate and rhythm, low volume  Language: intact  Psychomotor: normal or unremarkable  Mood: intermittent moderate dysthymia   Affect: restricted; was congruent to mood; was congruent to content  Thought Process/Associations: unremarkable  Thought Content:  Reports worries;  Denies suicidal ideation and violent ideation  Perception: Denies auditory hallucinations and visual hallucinations  Insight: good  Judgment: good  Cognition: (6) does  appear grossly intact; formal cognitive testing was not done  Gait and Station: not assessed due to telehealth format    Courtney Bunch, PhD. LP

## 2022-06-05 ENCOUNTER — HEALTH MAINTENANCE LETTER (OUTPATIENT)
Age: 27
End: 2022-06-05

## 2022-09-12 ENCOUNTER — VIRTUAL VISIT (OUTPATIENT)
Dept: PSYCHIATRY | Facility: CLINIC | Age: 27
End: 2022-09-12

## 2022-09-12 VITALS — BODY MASS INDEX: 21.26 KG/M2 | HEIGHT: 63 IN | WEIGHT: 120 LBS

## 2022-09-12 DIAGNOSIS — F33.1 MODERATE RECURRENT MAJOR DEPRESSION (H): ICD-10-CM

## 2022-09-12 DIAGNOSIS — F40.10 SOCIAL ANXIETY DISORDER: ICD-10-CM

## 2022-09-12 DIAGNOSIS — F42.2 MIXED OBSESSIONAL THOUGHTS AND ACTS: Primary | ICD-10-CM

## 2022-09-12 ASSESSMENT — ANXIETY QUESTIONNAIRES
1. FEELING NERVOUS, ANXIOUS, OR ON EDGE: NEARLY EVERY DAY
GAD7 TOTAL SCORE: 19
4. TROUBLE RELAXING: NEARLY EVERY DAY
3. WORRYING TOO MUCH ABOUT DIFFERENT THINGS: NEARLY EVERY DAY
5. BEING SO RESTLESS THAT IT IS HARD TO SIT STILL: SEVERAL DAYS
GAD7 TOTAL SCORE: 19
2. NOT BEING ABLE TO STOP OR CONTROL WORRYING: NEARLY EVERY DAY
7. FEELING AFRAID AS IF SOMETHING AWFUL MIGHT HAPPEN: NEARLY EVERY DAY
6. BECOMING EASILY ANNOYED OR IRRITABLE: NEARLY EVERY DAY

## 2022-09-12 ASSESSMENT — PATIENT HEALTH QUESTIONNAIRE - PHQ9: SUM OF ALL RESPONSES TO PHQ QUESTIONS 1-9: 22

## 2022-09-12 ASSESSMENT — PAIN SCALES - GENERAL: PAINLEVEL: NO PAIN (0)

## 2022-09-12 NOTE — PROGRESS NOTES
"Clarisse is a 27 year old who is being evaluated via a billable video visit.      How would you like to obtain your AVS? MyChart  If the video visit is dropped, the invitation should be resent by: Send to e-mail at: [email or web address]  Will anyone else be joining your video visit? No       NORAH Arzate  Clinician Contact & Progress Note  Department of Psychiatry & Behavioral Sciences  [facility]      Date of Service: Sep 12, 2022  Duration of interview with patient: Start Time: 15:20; Stop Time: 16:15  Provider: Courtney Bunch, PhD,   Psychiatrist: None  PCP: Faina Hopkins  Other Providers: None  Referred by self     Identifying Data:  Gus Regalado is a 27 year old female who prefers the name of \"Clarisse\". The following information was obtained through a clinical interview, self-report questionnaires, and chart review. For more information, see Diagnostic Evaluation by Courtney Bunch, PhD on 12/3/2019.    Telehealth Video Visit AddOn  Type of service: Psychotherapy.  The patient's condition can be safely assessed and treated via synchronous audio and visual telemedicine encounter.    Mode of Communication:  Video Conference via [employer]   Reason for Telemedicine Visit: This patient visit was converted to a Telehealth video visit to minimize exposure to COVID-19.  Originating Site (Patient Location): Patient's home  Distant Site (Provider Location): Provider Remote Setting  Consent:  The patient has verbally consented to: the potential risks and benefits of telemedicine versus in person care with special emphasis on confidentiality given family members in the home.  Attestation: As the provider I attest to compliance with applicable laws and regulations related to telemedicine.    Encounter Diagnoses   Name Primary?     Mixed obsessional thoughts and acts Yes     Social anxiety disorder      Moderate recurrent major depression (H)      Illness anxiety disorder      Assessment (current " "symptoms):  PHQ 4/4/2022 9/12/2022 9/12/2022   PHQ-9 Total Score 16 22 -   Q9: Thoughts of better off dead/self-harm past 2 weeks Several days Not at all (No Data)     ANEL-7 SCORE 1/3/2022 4/4/2022 9/12/2022   Total Score - - -   Total Score 14 11 19     Reports feeling low self-worth, increase in depression.   Weight gain 15 lbs 105 - 130 - 120 (current)    Updates since last visit:   Went to wedding in [place] in early August - stressful due to pressure from family friend and family    Treatment strategies:   Explored the following topics utilizing cognitive-behavioral, acceptance-based and mindfulness techniques:  - increase in depression symptoms  - passive/submissive behaviors: pt notes she has improved in this regard with CBT. However, continues to feel a great deal of social pressure from her family (\"take a sip\" incident)  - continued discussion regarding transfer of care (due to providers lack of regular availability and pt's increase in symptoms)    Homework:    Call [facility] : [phone] and ask to be referred to new psychotherapist (with experience in Illness Anxiety/OCD) for help with Illness anxiety disorder, depression, low SE, lack of assertiveness (submissive tendencies). Note critical parenting and upbringing consistent with cultural background.    Treatment Plan:     Transfer of care to provider with more availability for more frequent encounters (recommend at least weekly)    Continue CBT /ERP/ACT for Illness anxiety and depression (OCD regarding COVID-19 infection resolved with COVID-19 infection) with new provider    Continue with psych medication management.     Reach out to /myself or crisis hotlines should symptoms worsen and she wants to schedule earlier    RTC: 2 months for check-in regarding transfer of care to new therapist      MENTAL STATUS EXAM                                                                                       Alertness: alert  and " oriented  Appearance: well groomed  Behavior/Demeanor: polite, quiet, cooperative and pleasant with good  eye contact   Speech: regular rate and rhythm, low volume  Language: intact  Psychomotor: normal or unremarkable  Mood: depressed  Affect: restricted; was congruent to mood; was congruent to content  Thought Process/Associations: unremarkable  Thought Content:  Reports worries;  Denies suicidal ideation and violent ideation  Perception: Denies auditory hallucinations and visual hallucinations  Insight: good  Judgment: good  Cognition: (6) does  appear grossly intact; formal cognitive testing was not done  Gait and Station: not assessed due to telehealth format    Courtney Bunch, PhD. LP

## 2022-09-12 NOTE — NURSING NOTE
Patient scored 22 on PHQ-9      Patient is also taking a Probiotic daily oral, Multi vitamin.    Meche Grimm, VF

## 2022-10-16 ENCOUNTER — HEALTH MAINTENANCE LETTER (OUTPATIENT)
Age: 27
End: 2022-10-16

## 2023-06-17 ENCOUNTER — HEALTH MAINTENANCE LETTER (OUTPATIENT)
Age: 28
End: 2023-06-17

## 2024-08-10 ENCOUNTER — HEALTH MAINTENANCE LETTER (OUTPATIENT)
Age: 29
End: 2024-08-10

## 2025-08-16 ENCOUNTER — HEALTH MAINTENANCE LETTER (OUTPATIENT)
Age: 30
End: 2025-08-16

## (undated) DEVICE — SOL NACL 0.9% INJ 1000ML BAG 2B1324X

## (undated) DEVICE — Device

## (undated) DEVICE — STPL POWERED ECHELON 45MM PSEE45A

## (undated) DEVICE — SUCTION IRR STRYKERFLOW II W/TIP 250-070-520

## (undated) DEVICE — ESU GROUND PAD UNIVERSAL W/O CORD

## (undated) DEVICE — STPL RELOAD REG TISSUE ECHELON 45 X 3.6MM BLUE GST45B

## (undated) DEVICE — SOL WATER IRRIG 1000ML BOTTLE 2F7114

## (undated) DEVICE — ENDO POUCH REIACATCH 2.44" 10MM CATCH10

## (undated) DEVICE — GLOVE GAMMEX DERMAPRENE ULTRA SZ 8 LF 8516

## (undated) DEVICE — ENDO TROCAR OPTICAL 05MM VERSAPORT PLUS W/FIX CAN ONB5STF

## (undated) DEVICE — SU MONOCRYL 4-0 PS-2 18" UND Y496G

## (undated) DEVICE — SUCTION CANISTER MEDIVAC LINER 3000ML W/LID 65651-530

## (undated) DEVICE — ESU HOLDER LAP INST DISP PURPLE LONG 330MM H-PRO-330

## (undated) DEVICE — LINEN TOWEL PACK X5 5464

## (undated) DEVICE — PACK LAP CHOLE SLC15LCFSD

## (undated) DEVICE — SU VICRYL 0 UR-6 27" J603H

## (undated) DEVICE — ENDO CANNULA 05MM VERSAONE UNIVERSAL UNVCA5STF

## (undated) DEVICE — GLOVE PROTEXIS W/NEU-THERA 7.5  2D73TE75

## (undated) DEVICE — ENDO TROCAR OPTICAL 12MM VERSAPORT PLUS W/FIX CAN ONB12STF

## (undated) DEVICE — PREP CHLORAPREP 26ML TINTED ORANGE  260815

## (undated) DEVICE — CLIP APPLIER ENDO 5MM M/L LIGAMAX EL5ML

## (undated) RX ORDER — PROPOFOL 10 MG/ML
INJECTION, EMULSION INTRAVENOUS
Status: DISPENSED
Start: 2018-07-05

## (undated) RX ORDER — LIDOCAINE HYDROCHLORIDE 20 MG/ML
INJECTION, SOLUTION EPIDURAL; INFILTRATION; INTRACAUDAL; PERINEURAL
Status: DISPENSED
Start: 2018-07-05

## (undated) RX ORDER — FENTANYL CITRATE 50 UG/ML
INJECTION, SOLUTION INTRAMUSCULAR; INTRAVENOUS
Status: DISPENSED
Start: 2018-07-05

## (undated) RX ORDER — GLYCOPYRROLATE 0.2 MG/ML
INJECTION, SOLUTION INTRAMUSCULAR; INTRAVENOUS
Status: DISPENSED
Start: 2018-07-05

## (undated) RX ORDER — DEXAMETHASONE SODIUM PHOSPHATE 4 MG/ML
INJECTION, SOLUTION INTRA-ARTICULAR; INTRALESIONAL; INTRAMUSCULAR; INTRAVENOUS; SOFT TISSUE
Status: DISPENSED
Start: 2018-07-05

## (undated) RX ORDER — BUPIVACAINE HYDROCHLORIDE AND EPINEPHRINE 5; 5 MG/ML; UG/ML
INJECTION, SOLUTION EPIDURAL; INTRACAUDAL; PERINEURAL
Status: DISPENSED
Start: 2018-07-05

## (undated) RX ORDER — LORAZEPAM 2 MG/ML
INJECTION INTRAMUSCULAR
Status: DISPENSED
Start: 2018-07-05

## (undated) RX ORDER — LIDOCAINE HYDROCHLORIDE 10 MG/ML
INJECTION, SOLUTION EPIDURAL; INFILTRATION; INTRACAUDAL; PERINEURAL
Status: DISPENSED
Start: 2018-07-05

## (undated) RX ORDER — SCOLOPAMINE TRANSDERMAL SYSTEM 1 MG/1
PATCH, EXTENDED RELEASE TRANSDERMAL
Status: DISPENSED
Start: 2018-07-05

## (undated) RX ORDER — HYDROMORPHONE HYDROCHLORIDE 1 MG/ML
INJECTION, SOLUTION INTRAMUSCULAR; INTRAVENOUS; SUBCUTANEOUS
Status: DISPENSED
Start: 2018-07-05

## (undated) RX ORDER — ONDANSETRON 2 MG/ML
INJECTION INTRAMUSCULAR; INTRAVENOUS
Status: DISPENSED
Start: 2018-07-05